# Patient Record
Sex: MALE | Race: WHITE | HISPANIC OR LATINO | Employment: OTHER | URBAN - METROPOLITAN AREA
[De-identification: names, ages, dates, MRNs, and addresses within clinical notes are randomized per-mention and may not be internally consistent; named-entity substitution may affect disease eponyms.]

---

## 2023-04-27 ENCOUNTER — LAB REQUISITION (OUTPATIENT)
Dept: LAB | Facility: CLINIC | Age: 73
End: 2023-04-27
Payer: MEDICARE

## 2023-04-27 DIAGNOSIS — E11.9 TYPE 2 DIABETES MELLITUS WITHOUT COMPLICATIONS (H): ICD-10-CM

## 2023-04-27 DIAGNOSIS — I10 ESSENTIAL (PRIMARY) HYPERTENSION: ICD-10-CM

## 2023-04-27 DIAGNOSIS — E07.9 DISORDER OF THYROID, UNSPECIFIED: ICD-10-CM

## 2023-04-27 DIAGNOSIS — E55.9 VITAMIN D DEFICIENCY, UNSPECIFIED: ICD-10-CM

## 2023-04-27 DIAGNOSIS — E53.8 DEFICIENCY OF OTHER SPECIFIED B GROUP VITAMINS: ICD-10-CM

## 2023-04-28 LAB
ALBUMIN SERPL BCG-MCNC: 3.6 G/DL (ref 3.5–5.2)
ALP SERPL-CCNC: 112 U/L (ref 40–129)
ALT SERPL W P-5'-P-CCNC: 9 U/L (ref 10–50)
ANION GAP SERPL CALCULATED.3IONS-SCNC: 12 MMOL/L (ref 7–15)
AST SERPL W P-5'-P-CCNC: 14 U/L (ref 10–50)
BILIRUB SERPL-MCNC: 0.3 MG/DL
BUN SERPL-MCNC: 10.4 MG/DL (ref 8–23)
CALCIUM SERPL-MCNC: 9.5 MG/DL (ref 8.8–10.2)
CHLORIDE SERPL-SCNC: 103 MMOL/L (ref 98–107)
CREAT SERPL-MCNC: 0.68 MG/DL (ref 0.67–1.17)
DEPRECATED CALCIDIOL+CALCIFEROL SERPL-MC: 35 UG/L (ref 20–75)
DEPRECATED HCO3 PLAS-SCNC: 24 MMOL/L (ref 22–29)
ERYTHROCYTE [DISTWIDTH] IN BLOOD BY AUTOMATED COUNT: 17.5 % (ref 10–15)
FOLATE SERPL-MCNC: 11.2 NG/ML (ref 4.6–34.8)
GFR SERPL CREATININE-BSD FRML MDRD: >90 ML/MIN/1.73M2
GLUCOSE SERPL-MCNC: 77 MG/DL (ref 70–99)
HBA1C MFR BLD: 6.2 %
HCT VFR BLD AUTO: 38.1 % (ref 40–53)
HGB BLD-MCNC: 11.4 G/DL (ref 13.3–17.7)
MCH RBC QN AUTO: 21.2 PG (ref 26.5–33)
MCHC RBC AUTO-ENTMCNC: 29.9 G/DL (ref 31.5–36.5)
MCV RBC AUTO: 71 FL (ref 78–100)
PLATELET # BLD AUTO: 238 10E3/UL (ref 150–450)
POTASSIUM SERPL-SCNC: 4 MMOL/L (ref 3.4–5.3)
PROT SERPL-MCNC: 6.2 G/DL (ref 6.4–8.3)
RBC # BLD AUTO: 5.39 10E6/UL (ref 4.4–5.9)
SODIUM SERPL-SCNC: 139 MMOL/L (ref 136–145)
TSH SERPL DL<=0.005 MIU/L-ACNC: 1.71 UIU/ML (ref 0.3–4.2)
VIT B12 SERPL-MCNC: 338 PG/ML (ref 232–1245)
WBC # BLD AUTO: 6.1 10E3/UL (ref 4–11)

## 2023-04-28 PROCEDURE — 80053 COMPREHEN METABOLIC PANEL: CPT | Mod: ORL | Performed by: FAMILY MEDICINE

## 2023-04-28 PROCEDURE — 82306 VITAMIN D 25 HYDROXY: CPT | Mod: ORL | Performed by: FAMILY MEDICINE

## 2023-04-28 PROCEDURE — 82607 VITAMIN B-12: CPT | Mod: ORL | Performed by: FAMILY MEDICINE

## 2023-04-28 PROCEDURE — 84443 ASSAY THYROID STIM HORMONE: CPT | Mod: ORL | Performed by: FAMILY MEDICINE

## 2023-04-28 PROCEDURE — 36415 COLL VENOUS BLD VENIPUNCTURE: CPT | Mod: ORL | Performed by: FAMILY MEDICINE

## 2023-04-28 PROCEDURE — P9603 ONE-WAY ALLOW PRORATED MILES: HCPCS | Mod: ORL | Performed by: FAMILY MEDICINE

## 2023-04-28 PROCEDURE — 83036 HEMOGLOBIN GLYCOSYLATED A1C: CPT | Mod: ORL | Performed by: FAMILY MEDICINE

## 2023-04-28 PROCEDURE — 85027 COMPLETE CBC AUTOMATED: CPT | Mod: ORL | Performed by: FAMILY MEDICINE

## 2023-04-28 PROCEDURE — 82746 ASSAY OF FOLIC ACID SERUM: CPT | Mod: ORL | Performed by: FAMILY MEDICINE

## 2023-06-01 ENCOUNTER — HOSPITAL ENCOUNTER (OUTPATIENT)
Dept: PET IMAGING | Facility: CLINIC | Age: 73
Discharge: HOME | End: 2023-06-01
Attending: INTERNAL MEDICINE | Admitting: INTERNAL MEDICINE
Payer: COMMERCIAL

## 2023-06-01 DIAGNOSIS — C61 PRIMARY MALIGNANT NEOPLASM OF PROSTATE (H): ICD-10-CM

## 2023-06-01 PROCEDURE — A9596 HC RX 343: HCPCS

## 2023-06-01 PROCEDURE — 343N000001 HC RX 343

## 2023-06-01 PROCEDURE — 78815 PET IMAGE W/CT SKULL-THIGH: CPT | Mod: 26 | Performed by: RADIOLOGY

## 2023-06-01 PROCEDURE — 78815 PET IMAGE W/CT SKULL-THIGH: CPT | Mod: PS

## 2023-06-01 RX ADMIN — KIT FOR THE PREPARATION OF GALLIUM GA 68 GOZETOTIDE INJECTION 5.88 MILLICURIE: KIT INTRAVENOUS at 13:50

## 2023-08-13 ENCOUNTER — HEALTH MAINTENANCE LETTER (OUTPATIENT)
Age: 73
End: 2023-08-13

## 2023-09-20 ENCOUNTER — LAB REQUISITION (OUTPATIENT)
Dept: LAB | Facility: CLINIC | Age: 73
End: 2023-09-20
Payer: MEDICARE

## 2023-09-20 DIAGNOSIS — I10 ESSENTIAL (PRIMARY) HYPERTENSION: ICD-10-CM

## 2023-09-20 DIAGNOSIS — E11.40 TYPE 2 DIABETES MELLITUS WITH DIABETIC NEUROPATHY, UNSPECIFIED (H): ICD-10-CM

## 2023-09-20 LAB
ANION GAP SERPL CALCULATED.3IONS-SCNC: 12 MMOL/L (ref 7–15)
BASOPHILS # BLD AUTO: 0 10E3/UL (ref 0–0.2)
BASOPHILS NFR BLD AUTO: 1 %
BUN SERPL-MCNC: 12 MG/DL (ref 8–23)
CALCIUM SERPL-MCNC: 9 MG/DL (ref 8.8–10.2)
CHLORIDE SERPL-SCNC: 101 MMOL/L (ref 98–107)
CREAT SERPL-MCNC: 0.71 MG/DL (ref 0.67–1.17)
DEPRECATED HCO3 PLAS-SCNC: 28 MMOL/L (ref 22–29)
EGFRCR SERPLBLD CKD-EPI 2021: >90 ML/MIN/1.73M2
EOSINOPHIL # BLD AUTO: 0.1 10E3/UL (ref 0–0.7)
EOSINOPHIL NFR BLD AUTO: 2 %
ERYTHROCYTE [DISTWIDTH] IN BLOOD BY AUTOMATED COUNT: 15.5 % (ref 10–15)
GLUCOSE SERPL-MCNC: 96 MG/DL (ref 70–99)
HBA1C MFR BLD: 6.8 %
HCT VFR BLD AUTO: 38.9 % (ref 40–53)
HGB BLD-MCNC: 11.7 G/DL (ref 13.3–17.7)
IMM GRANULOCYTES # BLD: 0 10E3/UL
IMM GRANULOCYTES NFR BLD: 0 %
LYMPHOCYTES # BLD AUTO: 2.2 10E3/UL (ref 0.8–5.3)
LYMPHOCYTES NFR BLD AUTO: 37 %
MCH RBC QN AUTO: 22.2 PG (ref 26.5–33)
MCHC RBC AUTO-ENTMCNC: 30.1 G/DL (ref 31.5–36.5)
MCV RBC AUTO: 74 FL (ref 78–100)
MONOCYTES # BLD AUTO: 0.6 10E3/UL (ref 0–1.3)
MONOCYTES NFR BLD AUTO: 10 %
NEUTROPHILS # BLD AUTO: 3 10E3/UL (ref 1.6–8.3)
NEUTROPHILS NFR BLD AUTO: 50 %
NRBC # BLD AUTO: 0 10E3/UL
NRBC BLD AUTO-RTO: 0 /100
PLATELET # BLD AUTO: 221 10E3/UL (ref 150–450)
POTASSIUM SERPL-SCNC: 3.6 MMOL/L (ref 3.4–5.3)
RBC # BLD AUTO: 5.27 10E6/UL (ref 4.4–5.9)
SODIUM SERPL-SCNC: 141 MMOL/L (ref 136–145)
WBC # BLD AUTO: 5.9 10E3/UL (ref 4–11)

## 2023-09-20 PROCEDURE — 83036 HEMOGLOBIN GLYCOSYLATED A1C: CPT | Mod: ORL | Performed by: INTERNAL MEDICINE

## 2023-09-20 PROCEDURE — P9604 ONE-WAY ALLOW PRORATED TRIP: HCPCS | Mod: ORL | Performed by: INTERNAL MEDICINE

## 2023-09-20 PROCEDURE — 85025 COMPLETE CBC W/AUTO DIFF WBC: CPT | Mod: ORL | Performed by: INTERNAL MEDICINE

## 2023-09-20 PROCEDURE — 80048 BASIC METABOLIC PNL TOTAL CA: CPT | Mod: ORL | Performed by: INTERNAL MEDICINE

## 2023-09-20 PROCEDURE — 36415 COLL VENOUS BLD VENIPUNCTURE: CPT | Mod: ORL | Performed by: INTERNAL MEDICINE

## 2023-11-02 ENCOUNTER — LAB REQUISITION (OUTPATIENT)
Dept: LAB | Facility: CLINIC | Age: 73
End: 2023-11-02
Payer: MEDICARE

## 2023-11-02 DIAGNOSIS — I10 ESSENTIAL (PRIMARY) HYPERTENSION: ICD-10-CM

## 2023-11-03 LAB
ALBUMIN SERPL BCG-MCNC: 3.6 G/DL (ref 3.5–5.2)
ALP SERPL-CCNC: 95 U/L (ref 40–129)
ALT SERPL W P-5'-P-CCNC: 11 U/L (ref 0–70)
ANION GAP SERPL CALCULATED.3IONS-SCNC: 10 MMOL/L (ref 7–15)
AST SERPL W P-5'-P-CCNC: 15 U/L (ref 0–45)
BILIRUB SERPL-MCNC: 0.2 MG/DL
BUN SERPL-MCNC: 10.8 MG/DL (ref 8–23)
CALCIUM SERPL-MCNC: 9.2 MG/DL (ref 8.8–10.2)
CHLORIDE SERPL-SCNC: 103 MMOL/L (ref 98–107)
CREAT SERPL-MCNC: 0.7 MG/DL (ref 0.67–1.17)
DEPRECATED HCO3 PLAS-SCNC: 29 MMOL/L (ref 22–29)
EGFRCR SERPLBLD CKD-EPI 2021: >90 ML/MIN/1.73M2
GLUCOSE SERPL-MCNC: 83 MG/DL (ref 70–99)
MAGNESIUM SERPL-MCNC: 1.8 MG/DL (ref 1.7–2.3)
POTASSIUM SERPL-SCNC: 4.1 MMOL/L (ref 3.4–5.3)
PROT SERPL-MCNC: 6.5 G/DL (ref 6.4–8.3)
SODIUM SERPL-SCNC: 142 MMOL/L (ref 135–145)

## 2023-11-03 PROCEDURE — 80053 COMPREHEN METABOLIC PANEL: CPT | Mod: ORL | Performed by: INTERNAL MEDICINE

## 2023-11-03 PROCEDURE — 83735 ASSAY OF MAGNESIUM: CPT | Mod: ORL | Performed by: INTERNAL MEDICINE

## 2023-11-03 PROCEDURE — P9604 ONE-WAY ALLOW PRORATED TRIP: HCPCS | Mod: ORL | Performed by: INTERNAL MEDICINE

## 2023-11-03 PROCEDURE — 36415 COLL VENOUS BLD VENIPUNCTURE: CPT | Mod: ORL | Performed by: INTERNAL MEDICINE

## 2023-11-04 ENCOUNTER — LAB REQUISITION (OUTPATIENT)
Dept: LAB | Facility: CLINIC | Age: 73
End: 2023-11-04
Payer: MEDICARE

## 2023-11-04 DIAGNOSIS — I10 ESSENTIAL (PRIMARY) HYPERTENSION: ICD-10-CM

## 2023-11-24 ENCOUNTER — LAB REQUISITION (OUTPATIENT)
Dept: LAB | Facility: CLINIC | Age: 73
End: 2023-11-24
Payer: MEDICARE

## 2023-11-24 DIAGNOSIS — R82.90 UNSPECIFIED ABNORMAL FINDINGS IN URINE: ICD-10-CM

## 2023-11-24 LAB
ALBUMIN UR-MCNC: 30 MG/DL
APPEARANCE UR: ABNORMAL
BILIRUB UR QL STRIP: NEGATIVE
COLOR UR AUTO: ABNORMAL
GLUCOSE UR STRIP-MCNC: NEGATIVE MG/DL
HGB UR QL STRIP: ABNORMAL
KETONES UR STRIP-MCNC: NEGATIVE MG/DL
LEUKOCYTE ESTERASE UR QL STRIP: ABNORMAL
NITRATE UR QL: NEGATIVE
PH UR STRIP: 7.5 [PH] (ref 5–7)
RBC URINE: >182 /HPF
SP GR UR STRIP: 1.01 (ref 1–1.03)
TRANSITIONAL EPI: <1 /HPF
UROBILINOGEN UR STRIP-MCNC: NORMAL MG/DL
WBC URINE: 29 /HPF

## 2023-11-24 PROCEDURE — 81001 URINALYSIS AUTO W/SCOPE: CPT | Mod: ORL | Performed by: INTERNAL MEDICINE

## 2023-11-24 PROCEDURE — 87086 URINE CULTURE/COLONY COUNT: CPT | Mod: ORL | Performed by: INTERNAL MEDICINE

## 2023-11-25 LAB — BACTERIA UR CULT: NORMAL

## 2023-11-26 ENCOUNTER — LAB REQUISITION (OUTPATIENT)
Dept: LAB | Facility: CLINIC | Age: 73
End: 2023-11-26
Payer: MEDICARE

## 2023-11-26 DIAGNOSIS — E11.40 TYPE 2 DIABETES MELLITUS WITH DIABETIC NEUROPATHY, UNSPECIFIED (H): ICD-10-CM

## 2023-11-28 LAB
ANION GAP SERPL CALCULATED.3IONS-SCNC: 12 MMOL/L (ref 7–15)
BASOPHILS # BLD AUTO: 0 10E3/UL (ref 0–0.2)
BASOPHILS NFR BLD AUTO: 1 %
BUN SERPL-MCNC: 10.6 MG/DL (ref 8–23)
CALCIUM SERPL-MCNC: 9.4 MG/DL (ref 8.8–10.2)
CHLORIDE SERPL-SCNC: 101 MMOL/L (ref 98–107)
CREAT SERPL-MCNC: 0.69 MG/DL (ref 0.67–1.17)
DEPRECATED HCO3 PLAS-SCNC: 25 MMOL/L (ref 22–29)
EGFRCR SERPLBLD CKD-EPI 2021: >90 ML/MIN/1.73M2
EOSINOPHIL # BLD AUTO: 0.2 10E3/UL (ref 0–0.7)
EOSINOPHIL NFR BLD AUTO: 2 %
ERYTHROCYTE [DISTWIDTH] IN BLOOD BY AUTOMATED COUNT: 15.7 % (ref 10–15)
GLUCOSE SERPL-MCNC: 155 MG/DL (ref 70–99)
HCT VFR BLD AUTO: 38 % (ref 40–53)
HGB BLD-MCNC: 11.5 G/DL (ref 13.3–17.7)
IMM GRANULOCYTES # BLD: 0 10E3/UL
IMM GRANULOCYTES NFR BLD: 1 %
LYMPHOCYTES # BLD AUTO: 2.4 10E3/UL (ref 0.8–5.3)
LYMPHOCYTES NFR BLD AUTO: 37 %
MCH RBC QN AUTO: 21.6 PG (ref 26.5–33)
MCHC RBC AUTO-ENTMCNC: 30.3 G/DL (ref 31.5–36.5)
MCV RBC AUTO: 71 FL (ref 78–100)
MONOCYTES # BLD AUTO: 0.6 10E3/UL (ref 0–1.3)
MONOCYTES NFR BLD AUTO: 9 %
NEUTROPHILS # BLD AUTO: 3.4 10E3/UL (ref 1.6–8.3)
NEUTROPHILS NFR BLD AUTO: 50 %
NRBC # BLD AUTO: 0 10E3/UL
NRBC BLD AUTO-RTO: 0 /100
PLATELET # BLD AUTO: 243 10E3/UL (ref 150–450)
POTASSIUM SERPL-SCNC: 3.7 MMOL/L (ref 3.4–5.3)
RBC # BLD AUTO: 5.33 10E6/UL (ref 4.4–5.9)
SODIUM SERPL-SCNC: 138 MMOL/L (ref 135–145)
WBC # BLD AUTO: 6.6 10E3/UL (ref 4–11)

## 2023-11-28 PROCEDURE — 85025 COMPLETE CBC W/AUTO DIFF WBC: CPT | Mod: ORL | Performed by: INTERNAL MEDICINE

## 2023-11-28 PROCEDURE — 36415 COLL VENOUS BLD VENIPUNCTURE: CPT | Mod: ORL | Performed by: INTERNAL MEDICINE

## 2023-11-28 PROCEDURE — 80048 BASIC METABOLIC PNL TOTAL CA: CPT | Mod: ORL | Performed by: INTERNAL MEDICINE

## 2023-11-28 PROCEDURE — P9604 ONE-WAY ALLOW PRORATED TRIP: HCPCS | Mod: ORL | Performed by: INTERNAL MEDICINE

## 2023-12-31 ENCOUNTER — HEALTH MAINTENANCE LETTER (OUTPATIENT)
Age: 73
End: 2023-12-31

## 2024-01-24 ENCOUNTER — LAB REQUISITION (OUTPATIENT)
Dept: LAB | Facility: CLINIC | Age: 74
End: 2024-01-24
Payer: MEDICARE

## 2024-01-24 DIAGNOSIS — I10 ESSENTIAL (PRIMARY) HYPERTENSION: ICD-10-CM

## 2024-01-25 LAB
ANION GAP SERPL CALCULATED.3IONS-SCNC: 10 MMOL/L (ref 7–15)
BUN SERPL-MCNC: 14.7 MG/DL (ref 8–23)
CALCIUM SERPL-MCNC: 9.3 MG/DL (ref 8.8–10.2)
CHLORIDE SERPL-SCNC: 100 MMOL/L (ref 98–107)
CHOLEST SERPL-MCNC: 172 MG/DL
CREAT SERPL-MCNC: 0.79 MG/DL (ref 0.67–1.17)
DEPRECATED HCO3 PLAS-SCNC: 27 MMOL/L (ref 22–29)
EGFRCR SERPLBLD CKD-EPI 2021: >90 ML/MIN/1.73M2
FASTING STATUS PATIENT QL REPORTED: NO
GLUCOSE SERPL-MCNC: 136 MG/DL (ref 70–99)
HDLC SERPL-MCNC: 64 MG/DL
LDLC SERPL CALC-MCNC: 85 MG/DL
NONHDLC SERPL-MCNC: 108 MG/DL
POTASSIUM SERPL-SCNC: 3.8 MMOL/L (ref 3.4–5.3)
SODIUM SERPL-SCNC: 137 MMOL/L (ref 135–145)
TRIGL SERPL-MCNC: 114 MG/DL

## 2024-01-25 PROCEDURE — 80048 BASIC METABOLIC PNL TOTAL CA: CPT | Mod: ORL | Performed by: INTERNAL MEDICINE

## 2024-01-25 PROCEDURE — 80061 LIPID PANEL: CPT | Mod: ORL | Performed by: INTERNAL MEDICINE

## 2024-01-25 PROCEDURE — 36415 COLL VENOUS BLD VENIPUNCTURE: CPT | Mod: ORL | Performed by: INTERNAL MEDICINE

## 2024-01-25 PROCEDURE — P9604 ONE-WAY ALLOW PRORATED TRIP: HCPCS | Mod: ORL | Performed by: INTERNAL MEDICINE

## 2024-03-13 ENCOUNTER — LAB REQUISITION (OUTPATIENT)
Dept: LAB | Facility: CLINIC | Age: 74
End: 2024-03-13
Payer: MEDICARE

## 2024-03-13 DIAGNOSIS — N39.0 URINARY TRACT INFECTION, SITE NOT SPECIFIED: ICD-10-CM

## 2024-05-19 ENCOUNTER — HEALTH MAINTENANCE LETTER (OUTPATIENT)
Age: 74
End: 2024-05-19

## 2024-06-05 ENCOUNTER — HOSPITAL ENCOUNTER (EMERGENCY)
Facility: CLINIC | Age: 74
Discharge: NURSING, REHAB OR LONG-TERM CARE | End: 2024-06-05
Attending: FAMILY MEDICINE | Admitting: FAMILY MEDICINE
Payer: COMMERCIAL

## 2024-06-05 VITALS
RESPIRATION RATE: 20 BRPM | HEART RATE: 83 BPM | DIASTOLIC BLOOD PRESSURE: 81 MMHG | HEIGHT: 66 IN | WEIGHT: 195 LBS | SYSTOLIC BLOOD PRESSURE: 141 MMHG | BODY MASS INDEX: 31.34 KG/M2 | TEMPERATURE: 98.4 F | OXYGEN SATURATION: 95 %

## 2024-06-05 DIAGNOSIS — Z46.6 URINARY CATHETER (FOLEY) CHANGE REQUIRED: ICD-10-CM

## 2024-06-05 PROCEDURE — 250N000009 HC RX 250: Performed by: FAMILY MEDICINE

## 2024-06-05 PROCEDURE — 99283 EMERGENCY DEPT VISIT LOW MDM: CPT | Mod: 25

## 2024-06-05 RX ORDER — LIDOCAINE HYDROCHLORIDE 20 MG/ML
10 JELLY TOPICAL ONCE
Status: COMPLETED | OUTPATIENT
Start: 2024-06-05 | End: 2024-06-05

## 2024-06-05 RX ADMIN — LIDOCAINE HYDROCHLORIDE 10 ML: 20 JELLY TOPICAL at 22:43

## 2024-06-05 ASSESSMENT — COLUMBIA-SUICIDE SEVERITY RATING SCALE - C-SSRS
2. HAVE YOU ACTUALLY HAD ANY THOUGHTS OF KILLING YOURSELF IN THE PAST MONTH?: NO
6. HAVE YOU EVER DONE ANYTHING, STARTED TO DO ANYTHING, OR PREPARED TO DO ANYTHING TO END YOUR LIFE?: NO
1. IN THE PAST MONTH, HAVE YOU WISHED YOU WERE DEAD OR WISHED YOU COULD GO TO SLEEP AND NOT WAKE UP?: NO

## 2024-06-05 ASSESSMENT — ACTIVITIES OF DAILY LIVING (ADL)
ADLS_ACUITY_SCORE: 35
ADLS_ACUITY_SCORE: 35

## 2024-06-06 NOTE — ED PROVIDER NOTES
EMERGENCY DEPARTMENT ENCOUNTER      NAME: James Esteban  AGE: 73 year old male  YOB: 1950  MRN: [id number]  EVALUATION DATE & TIME: 6/5/2024  9:30 PM    PCP: [facility]    ED PROVIDER: Juan Ramon Farris M.D.    Chief Complaint   Patient presents with    Catheter Problem       FINAL IMPRESSION:  1. Urinary catheter ([place]) change required        ED COURSE & MEDICAL DECISION MAKING:    Pertinent Labs & Imaging studies independently interpreted by me. (See chart for details)  Reviewed most recent primary care summary, patient with incomplete paraplegia and hypertension as well as history of malignant neoplasm of the prostate and neuromuscular dysfunction of the bladder which is why he has a indwelling urinary catheter.    ED Course as of 06/05/24 2249 Wed Jun 05, 2024 2212 Patient seen and examined, presents today with catheter concern.  Patient says that his penis hurts although he feels better now.  On exam, uncircumcised male, catheter in place and foreskin is in place.  The catheter has dark yellow urine, when the bag is placed to gravity it does drain.  Due to pain and concern for obstruction, catheter will be changed out today.  No fever, abdominal pain, nausea or vomiting to suggest infection.       10:08 PM  I met with the patient, obtained history, performed an initial exam, and discussed options and plan for diagnostics and treatment here in the ED.    10:30 PM We discussed the plan for discharge and the patient is agreeable. Reviewed supportive cares, symptomatic treatment, outpatient follow up, and reasons to return to the Emergency Department. Patient to be discharged by ED RN.    At the conclusion of the encounter I discussed the results of all of the tests and the disposition. The questions were answered. The patient or family acknowledged understanding and was agreeable with the care plan.     Medical Decision Making  Obtained supplemental history:Supplemental  history obtained?: Documented in chart  Reviewed external records: External records reviewed?: Documented in chart  Care impacted by chronic illness:Cancer/Chemotherapy, Diabetes, and Hypertension  Care significantly affected by social determinants of health:N/A  Did you consider but not order tests?: Work up considered but not performed and documented in chart, if applicable  Did you interpret images independently?: Independent interpretation of ECG and images noted in documentation, when applicable.  Consultation discussion with other provider:Did you involve another provider (consultant, , pharmacy, etc.)?: No  Discharge. No recommendations on prescription strength medication(s). See documentation for any additional details.        PROCEDURES:       MEDICATIONS GIVEN IN THE EMERGENCY:  Medications   lidocaine (XYLOCAINE) 2 % external gel 10 mL (10 mLs Urethral $Given 6/5/24 7342)       NEW PRESCRIPTIONS STARTED AT TODAY'S ER VISIT  New Prescriptions    No medications on file       =================================================================    HPI    Patient information was obtained from: Patient and Nurse triage note      James Esteban is a 73 year old male with a pertinent history of prostate cancer,  hypertension, and type II diabetes who presents to this ED by EMS for evaluation of a catheter problem.    Per nurse triage note, patient comes in by EMS today (6/05/24) from a senior living group home. He has had issues with his indwelling catheter. Noted to smell of foul urine upon arrival.     Patient reports penile pain for unspecified amount of time.  His catheter was last changed two weeks ago and has not been draining adequately.He has a history of prostate cancer with metastasis to his back. Following complications of tumour removals he became  wheelchair bound and had the catheter placed.    He denied any fever or vomiting. Also denied any known allergies. No other complaints at this  "time.    REVIEW OF SYSTEMS   Review of Systems   All other systems reviewed and negative    PAST MEDICAL HISTORY:  No past medical history on file.    PAST SURGICAL HISTORY:  No past surgical history on file.    CURRENT MEDICATIONS:    No current facility-administered medications for this encounter.     No current outpatient medications on file.       ALLERGIES:  Allergies   Allergen Reactions    Nuts Anaphylaxis    Shellfish Allergy Anaphylaxis, Hives and Shortness Of Breath    Cats        FAMILY HISTORY:  No family history on file.    SOCIAL HISTORY:   Social History     Socioeconomic History    Marital status:      Social Determinants of Health     Financial Resource Strain: Medium Risk (4/28/2023)    Received from [facility], [employer]  [facility]    Financial Resource Strain     Difficulty of Paying Living Expenses: 1     Difficulty of Paying Living Expenses: 2   Food Insecurity: No Food Insecurity (4/28/2023)    [facility], [facility]    Food Insecurity     Worried About Running Out of Food in the Last Year: 1   Transportation Needs: Unmet Transportation Needs (4/28/2023)    [facility], [facility]    Transportation Needs     Lack of Transportation (Medical): 2    Received from [facility]    Social Connections   Housing Stability: Low Risk  (4/28/2023)    Received from [facility], [email or web address]    Housing Stability     Unable to Pay for Housing in the Last Year: 1       VITALS:  BP (!) 156/90   Pulse 84   Temp 98.4  F (36.9  C) (Oral)   Resp 20   Ht 1.676 m (5' 6\")   Wt 88.5 kg (195 lb)   SpO2 96%   BMI 31.47 kg/m      PHYSICAL EXAM:  Physical Exam  Vitals and nursing note reviewed. "   Constitutional:       Appearance: Normal appearance.   HENT:      Head: Normocephalic and atraumatic.      Right Ear: External ear normal.      Left Ear: External ear normal.      Nose: Nose normal.      Mouth/Throat:      Mouth: Mucous membranes are moist.   Eyes:      Extraocular Movements: Extraocular movements intact.      Conjunctiva/sclera: Conjunctivae normal.      Pupils: Pupils are equal, round, and reactive to light.   Cardiovascular:      Rate and Rhythm: Normal rate and regular rhythm.   Pulmonary:      Effort: Pulmonary effort is normal.      Breath sounds: Normal breath sounds. No wheezing or rales.   Abdominal:      General: Abdomen is flat. There is no distension.      Palpations: Abdomen is soft.      Tenderness: There is no abdominal tenderness. There is no guarding.   Genitourinary:     Penis: Uncircumcised.       Comments: Urinary catheter in place slowly draining yellow urine.  Musculoskeletal:         General: Normal range of motion.      Cervical back: Normal range of motion and neck supple.      Right lower leg: No edema.      Left lower leg: No edema.      Comments: Bilateral lower extremity atrophy.   Lymphadenopathy:      Cervical: No cervical adenopathy.   Skin:     General: Skin is warm and dry.   Neurological:      General: No focal deficit present.      Mental Status: He is alert and oriented to person, place, and time. Mental status is at baseline.      Comments: No gross focal neurologic deficits   Psychiatric:         Mood and Affect: Mood normal.         Behavior: Behavior normal.         Thought Content: Thought content normal.          LAB:  All pertinent labs reviewed and interpreted.       RADIOLOGY:  Reviewed all pertinent imaging. Please see official radiology report.  No orders to display       Niall BURDICK , am serving as a scribe to document services personally performed by Dr. Farris based on my observation and the provider's statements to me. Juan Ramon BURDICK  MD Kaleigh attest that Niall Casiano  is acting in a scribe capacity, has observed my performance of the services and has documented them in accordance with my direction.    Juan Ramon Farris M.D.  Emergency Medicine  [facility] EMERGENCY ROOM  [address] [address]  [phone]  Dept: [phone]       Juan Ramon Farris MD  06/06/24 1522

## 2024-06-06 NOTE — ED NOTES
Writer called and spoke to , told her that we replaced patient's indwelling catheter and will be sending him back.

## 2024-06-06 NOTE — ED TRIAGE NOTES
Increase carvedilol to 25mg BID - will send new prescription to pharmacy to update. Recheck blood pressure AND PULSE on ancillary schedule in 2 weeks, please schedule   Patient presents to the ED, brought in by EMS, he comes from [facility].  The reported patient is having issues with his indwelling catheter not draining.  Patient smells of foul urine upon arrival.  No other complaint.     Triage Assessment (Adult)       Row Name 06/05/24 0976          Triage Assessment    Airway WDL WDL        Respiratory WDL    Respiratory WDL WDL        Skin Circulation/Temperature WDL    Skin Circulation/Temperature WDL WDL        Cardiac WDL    Cardiac WDL WDL        Peripheral/Neurovascular WDL    Peripheral Neurovascular WDL WDL  paraplegia        Cognitive/Neuro/Behavioral WDL    Cognitive/Neuro/Behavioral WDL WDL

## 2024-06-25 ENCOUNTER — LAB REQUISITION (OUTPATIENT)
Dept: LAB | Facility: CLINIC | Age: 74
End: 2024-06-25
Payer: MEDICARE

## 2024-06-25 DIAGNOSIS — E87.5 HYPERKALEMIA: ICD-10-CM

## 2024-06-27 LAB — POTASSIUM SERPL-SCNC: 4.4 MMOL/L (ref 3.4–5.3)

## 2024-06-27 PROCEDURE — P9604 ONE-WAY ALLOW PRORATED TRIP: HCPCS | Mod: ORL | Performed by: STUDENT IN AN ORGANIZED HEALTH CARE EDUCATION/TRAINING PROGRAM

## 2024-06-27 PROCEDURE — 36415 COLL VENOUS BLD VENIPUNCTURE: CPT | Mod: ORL | Performed by: STUDENT IN AN ORGANIZED HEALTH CARE EDUCATION/TRAINING PROGRAM

## 2024-06-27 PROCEDURE — 84132 ASSAY OF SERUM POTASSIUM: CPT | Mod: ORL | Performed by: STUDENT IN AN ORGANIZED HEALTH CARE EDUCATION/TRAINING PROGRAM

## 2024-10-06 ENCOUNTER — HEALTH MAINTENANCE LETTER (OUTPATIENT)
Age: 74
End: 2024-10-06

## 2025-01-19 ENCOUNTER — HEALTH MAINTENANCE LETTER (OUTPATIENT)
Age: 75
End: 2025-01-19

## 2025-01-24 ENCOUNTER — NURSING HOME VISIT (OUTPATIENT)
Dept: GERIATRICS | Facility: CLINIC | Age: 75
End: 2025-01-24
Payer: MEDICARE

## 2025-01-24 ENCOUNTER — DOCUMENTATION ONLY (OUTPATIENT)
Dept: GERIATRICS | Facility: CLINIC | Age: 75
End: 2025-01-24

## 2025-01-24 VITALS
BODY MASS INDEX: 27.24 KG/M2 | OXYGEN SATURATION: 96 % | SYSTOLIC BLOOD PRESSURE: 128 MMHG | RESPIRATION RATE: 18 BRPM | DIASTOLIC BLOOD PRESSURE: 82 MMHG | WEIGHT: 169.5 LBS | HEIGHT: 66 IN | HEART RATE: 78 BPM | TEMPERATURE: 97.8 F

## 2025-01-24 DIAGNOSIS — C79.51 MALIGNANT NEOPLASM OF PROSTATE METASTATIC TO BONE (H): Primary | ICD-10-CM

## 2025-01-24 DIAGNOSIS — E11.9 TYPE 2 DIABETES MELLITUS WITHOUT COMPLICATION, WITHOUT LONG-TERM CURRENT USE OF INSULIN (H): ICD-10-CM

## 2025-01-24 DIAGNOSIS — N13.9 OBSTRUCTIVE UROPATHY: ICD-10-CM

## 2025-01-24 DIAGNOSIS — C61 MALIGNANT NEOPLASM OF PROSTATE METASTATIC TO BONE (H): Primary | ICD-10-CM

## 2025-01-24 DIAGNOSIS — G82.22 PARAPLEGIA, INCOMPLETE (H): ICD-10-CM

## 2025-01-24 DIAGNOSIS — I10 PRIMARY HYPERTENSION: ICD-10-CM

## 2025-01-24 PROCEDURE — 99310 SBSQ NF CARE HIGH MDM 45: CPT | Performed by: NURSE PRACTITIONER

## 2025-01-24 RX ORDER — LIDOCAINE 50 MG/G
OINTMENT TOPICAL DAILY
COMMUNITY

## 2025-01-24 RX ORDER — METFORMIN HYDROCHLORIDE 500 MG/1
500 TABLET, EXTENDED RELEASE ORAL 2 TIMES DAILY WITH MEALS
COMMUNITY
End: 2025-01-24

## 2025-01-24 RX ORDER — MAGNESIUM OXIDE 420 MG/1
1 TABLET ORAL 2 TIMES DAILY
COMMUNITY

## 2025-01-24 RX ORDER — METOPROLOL SUCCINATE 25 MG/1
25 TABLET, EXTENDED RELEASE ORAL DAILY
COMMUNITY

## 2025-01-24 RX ORDER — BACLOFEN 20 MG/1
20 TABLET ORAL 2 TIMES DAILY
COMMUNITY

## 2025-01-24 RX ORDER — TIZANIDINE HYDROCHLORIDE 4 MG/1
4 CAPSULE, GELATIN COATED ORAL 3 TIMES DAILY
COMMUNITY

## 2025-01-24 RX ORDER — HYDRALAZINE HYDROCHLORIDE 10 MG/1
10 TABLET, FILM COATED ORAL 3 TIMES DAILY PRN
COMMUNITY

## 2025-01-24 RX ORDER — CHLORTHALIDONE 25 MG/1
12.5 TABLET ORAL DAILY
COMMUNITY

## 2025-01-24 RX ORDER — ACETAMINOPHEN 325 MG/1
650 TABLET ORAL EVERY 6 HOURS PRN
COMMUNITY

## 2025-01-24 RX ORDER — SENNOSIDES 8.6 MG
2 TABLET ORAL AT BEDTIME
COMMUNITY

## 2025-01-24 RX ORDER — LORATADINE 10 MG/1
10 TABLET ORAL DAILY
COMMUNITY

## 2025-01-24 RX ORDER — LOSARTAN POTASSIUM 100 MG/1
100 TABLET ORAL DAILY
COMMUNITY

## 2025-01-24 RX ORDER — LIDOCAINE HYDROCHLORIDE 20 MG/ML
JELLY TOPICAL PRN
COMMUNITY

## 2025-01-24 NOTE — LETTER
1/24/2025      James Esteban  [facility]  [address]  [facility] [address]        [facility] GERIATRICS    PRIMARY CARE PROVIDER AND CLINIC:  [facility], [address] [facility] [address]  Chief Complaint   Patient presents with     [facility] Medical Record Number:  [id number]  Place of Service where encounter took place:  [facility] ([facility]) [[id number]]    James Esteban  is a 74 year old  (1950), [facility] the above [facility]. There is only a pre-op physical for surgery that is scheduled 1/29/25. He is undergoing hemithyroidectomy due to a suspicious nodule. PMH prostate cancer with mets to bone, Paraplegia due to mets s/p T9-11 decompressive spinal surgery 10/2022, DM2, HTN, neurogenic bowel.   There is a medicare annual visit done at [facility] 4/28/23. They noted that he just moved from [place] [facility]. During the April visit, his daughter said he had prostate surgery November 2022, but no chemo. He had a PET scan 6/2023 that showed multiple osseous metastases and tumor in right prostate gland. [facility], no further records in [facility].     HPI obtained from patient visit, review of nursing home record, discussion with facility staff, and Epic review.     HPI:    Patient is seen only briefly, staff are trying to get him up and OT is waiting for him. He mentions his surgery next week. Provider clarifies if he is here to stay or here for TCU. He says that he is staying here now.       CODE STATUS/ADVANCE DIRECTIVES DISCUSSION:  No CPR- Do NOT Intubate    ALLERGIES:   Allergies   Allergen Reactions     Nuts Anaphylaxis     Shellfish Allergy Anaphylaxis, Hives and Shortness Of Breath     Cats       PAST MEDICAL HISTORY: No past medical history on file.   PAST SURGICAL HISTORY:   has no past  surgical history on file.  FAMILY HISTORY: family history is not on file.  SOCIAL HISTORY:         Post Discharge Medication Reconciliation Status:   MED REC REQUIRED  Post Medication Reconciliation Status:  Discharge medications reconciled, continue medications without change       Current Outpatient Medications   Medication Sig Dispense Refill     acetaminophen (TYLENOL) 325 MG tablet Take 650 mg by mouth every 6 hours as needed for mild pain.       baclofen (LIORESAL) 20 MG tablet Take 20 mg by mouth 2 times daily.       chlorthalidone (HYGROTON) 25 MG tablet Take 12.5 mg by mouth daily.       darolutamide (NUBEQA) 300 MG tablet Take 600 mg by mouth 2 times daily. . Swallow tablets whole with food.       docusate sodium (ENEMEEZ) 283 MG enema Place 1 enema rectally daily.       hydrALAZINE (APRESOLINE) 10 MG tablet Take 10 mg by mouth 3 times daily as needed.       lidocaine (XYLOCAINE) 5 % external ointment Apply topically daily.       lidocaine (XYLOCAINE) external gel as needed for moderate pain.       loratadine (CLARITIN) 10 MG tablet Take 10 mg by mouth daily.       losartan (COZAAR) 100 MG tablet Take 100 mg by mouth daily.       Magnesium Oxide 420 MG TABS Take 1 tablet by mouth 2 times daily.       melatonin 3 MG CAPS Take 6 mg by mouth at bedtime.       metoprolol succinate ER (TOPROL XL) 25 MG 24 hr tablet Take 25 mg by mouth daily.       miconazole (MICATIN) 2 % external powder Apply topically 2 times daily.       NITROGLYCERIN TRANSDERMAL TD Place onto the skin.       omeprazole (PRILOSEC) 20 MG DR capsule Take 20 mg by mouth daily.       sennosides (SENOKOT) 8.6 MG tablet Take 2 tablets by mouth at bedtime.       tiZANidine (ZANAFLEX) 4 MG capsule Take 4 mg by mouth 3 times daily.       No current facility-administered medications for this visit.       ROS:  4 point ROS including Respiratory, CV, GI and , other than that noted in the HPI,  is negative    Vitals:  /82   Pulse 78   Temp 97.8  " F (36.6  C)   Resp 18   Ht 1.676 m (5' 6\")   Wt 76.9 kg (169 lb 8 oz)   SpO2 96%   BMI 27.36 kg/m    Exam:  GENERAL APPEARANCE:  Alert, in no distress  RESP:  no respiratory distress  CV:  no edema  :    Morales catheter, urine clear, gunnar  M/S:   no purposeful movement in legs  PSYCH:  oriented X 3, affect and mood normal    Lab/Diagnostic data:  [facility]    ASSESSMENT/PLAN:  (C61,  C79.51) Malignant neoplasm of prostate metastatic to bone (H)  (primary encounter diagnosis)  Comment: Current status unknown, but a typical prognosis would be about 5 years. Will try to discuss this further with patient at next visit. He obviously is not interested in comfort care yet given that he is having the thyroid surgery.   Plan: Continue current POC with no changes at this time and adjustments as needed.    (G82.22) Paraplegia, incomplete (H)  Comment: Due to cancer. No improvement expected. Requires 24 hour care  Plan: Continue current POC with no changes at this time and adjustments as needed.    (I10) Primary hypertension  Comment: Currently well controlled  Plan: Continue current POC with no changes at this time and adjustments as needed.    (E11.9) Type 2 diabetes mellitus without complication, without long-term current use of insulin (H)  Comment: On metformin. Controlled per A1c  Plan: Continue current POC with no changes at this time and adjustments as needed.    (N13.9) Obstructive uropathy  Comment: Chronic morales in place. This could be due to either obstructive uropathy or neurogenic bladder from cancer. No improvement expected        Total time spent with patient visit was 45 min including patient visit and review of past records.     Electronically signed by:  TRINITY Guthrie CNP                     Sincerely,        TRINITY Guthrie CNP    Electronically signed  "

## 2025-01-24 NOTE — PROGRESS NOTES
[facility] GERIATRICS    PRIMARY CARE PROVIDER AND CLINIC:  [facility], [address] [facility] [address]  Chief Complaint   Patient presents with    [facility] Medical Record Number:  [id number]  Place of Service where encounter took place:  [facility] [[id number]]    James Esteban  is a 74 year old  (1950), admitted to the above facility 1/22/25 from [facility]. [facility]. There is only a pre-op physical for surgery that is scheduled 1/29/25. He is undergoing hemithyroidectomy due to a suspicious nodule. PMH prostate cancer with mets to bone, Paraplegia due to mets s/p T9-11 decompressive spinal surgery 10/2022, DM2, HTN, neurogenic bowel.   There is a medicare annual visit done at [facility] 4/28/23. They noted that he just moved from [place] [facility]. During the April visit, his daughter said he had prostate surgery November 2022, but no chemo. He had a PET scan 6/2023 that showed multiple osseous metastases and tumor in right prostate gland. [facility], no further records in [facility].     HPI obtained from patient visit, review of nursing home record, discussion with facility staff, and Epic review.     HPI:    Patient is seen only briefly, staff are trying to get him up and OT is waiting for him. He mentions his surgery next week. Provider clarifies if he is here to stay or here for TCU. He says that he is staying here now.       CODE STATUS/ADVANCE DIRECTIVES DISCUSSION:  No CPR- Do NOT Intubate    ALLERGIES:   Allergies   Allergen Reactions    Nuts Anaphylaxis    Shellfish Allergy Anaphylaxis, Hives and Shortness Of Breath    Cats       PAST MEDICAL HISTORY: No past medical history on file.   PAST SURGICAL HISTORY:   has no past surgical history on file.  FAMILY HISTORY: family history is not on file.  SOCIAL HISTORY:         Post Discharge  "Medication Reconciliation Status:   MED REC REQUIRED  Post Medication Reconciliation Status:  Discharge medications reconciled, continue medications without change       Current Outpatient Medications   Medication Sig Dispense Refill    acetaminophen (TYLENOL) 325 MG tablet Take 650 mg by mouth every 6 hours as needed for mild pain.      baclofen (LIORESAL) 20 MG tablet Take 20 mg by mouth 2 times daily.      chlorthalidone (HYGROTON) 25 MG tablet Take 12.5 mg by mouth daily.      darolutamide (NUBEQA) 300 MG tablet Take 600 mg by mouth 2 times daily. . Swallow tablets whole with food.      docusate sodium (ENEMEEZ) 283 MG enema Place 1 enema rectally daily.      hydrALAZINE (APRESOLINE) 10 MG tablet Take 10 mg by mouth 3 times daily as needed.      lidocaine (XYLOCAINE) 5 % external ointment Apply topically daily.      lidocaine (XYLOCAINE) external gel as needed for moderate pain.      loratadine (CLARITIN) 10 MG tablet Take 10 mg by mouth daily.      losartan (COZAAR) 100 MG tablet Take 100 mg by mouth daily.      Magnesium Oxide 420 MG TABS Take 1 tablet by mouth 2 times daily.      melatonin 3 MG CAPS Take 6 mg by mouth at bedtime.      metoprolol succinate ER (TOPROL XL) 25 MG 24 hr tablet Take 25 mg by mouth daily.      miconazole (MICATIN) 2 % external powder Apply topically 2 times daily.      NITROGLYCERIN TRANSDERMAL TD Place onto the skin.      omeprazole (PRILOSEC) 20 MG DR capsule Take 20 mg by mouth daily.      sennosides (SENOKOT) 8.6 MG tablet Take 2 tablets by mouth at bedtime.      tiZANidine (ZANAFLEX) 4 MG capsule Take 4 mg by mouth 3 times daily.       No current facility-administered medications for this visit.       ROS:  4 point ROS including Respiratory, CV, GI and , other than that noted in the HPI,  is negative    Vitals:  /82   Pulse 78   Temp 97.8  F (36.6  C)   Resp 18   Ht 1.676 m (5' 6\")   Wt 76.9 kg (169 lb 8 oz)   SpO2 96%   BMI 27.36 kg/m    Exam:  GENERAL " APPEARANCE:  Alert, in no distress  RESP:  no respiratory distress  CV:  no edema  :    Morales catheter, urine clear, gunnar  M/S:   no purposeful movement in legs  PSYCH:  oriented X 3, affect and mood normal    Lab/Diagnostic data:  [facility] on [facility] pre[facility] reviewed    ASSESSMENT/PLAN:  (C61,  C79.51) Malignant neoplasm of prostate metastatic to bone (H)  (primary encounter diagnosis)  Comment: Current status unknown, but a typical prognosis would be about 5 years. Will try to discuss this further with patient at next visit. He obviously is not interested in comfort care yet given that he is having the thyroid surgery.   Plan: Continue current POC with no changes at this time and adjustments as needed.    (G82.22) Paraplegia, incomplete (H)  Comment: Due to cancer. No improvement expected. Requires 24 hour care  Plan: Continue current POC with no changes at this time and adjustments as needed.    (I10) Primary hypertension  Comment: Currently well controlled  Plan: Continue current POC with no changes at this time and adjustments as needed.    (E11.9) Type 2 diabetes mellitus without complication, without long-term current use of insulin (H)  Comment: On metformin. Controlled per A1c  Plan: Continue current POC with no changes at this time and adjustments as needed.    (N13.9) Obstructive uropathy  Comment: Chronic morales in place. This could be due to either obstructive uropathy or neurogenic bladder from cancer. No improvement expected        Total time spent with patient visit was 45 min including patient visit and review of past records.     Electronically signed by:  TRINITY Guthrie CNP

## 2025-01-27 PROBLEM — M10.9 GOUT: Status: ACTIVE | Noted: 2018-03-31

## 2025-01-27 PROBLEM — K59.2 NEUROGENIC BOWEL, NOT ELSEWHERE CLASSIFIED: Status: ACTIVE | Noted: 2023-09-12

## 2025-01-27 PROBLEM — K21.9 GASTRO-ESOPHAGEAL REFLUX DISEASE WITHOUT ESOPHAGITIS: Status: ACTIVE | Noted: 2023-09-12

## 2025-01-27 PROBLEM — C61 MALIGNANT NEOPLASM OF PROSTATE (H): Status: ACTIVE | Noted: 2023-09-12

## 2025-01-27 PROBLEM — E11.40 TYPE 2 DIABETES MELLITUS WITH DIABETIC NEUROPATHY, UNSPECIFIED (H): Status: ACTIVE | Noted: 2023-09-12

## 2025-01-27 PROBLEM — E11.40 TYPE 2 DIABETES MELLITUS WITH DIABETIC NEUROPATHY, UNSPECIFIED (H): Status: RESOLVED | Noted: 2023-09-12 | Resolved: 2025-01-27

## 2025-01-27 PROBLEM — G82.22 PARAPLEGIA, INCOMPLETE (H): Status: ACTIVE | Noted: 2023-09-12

## 2025-01-27 PROBLEM — C79.51 SECONDARY MALIGNANT NEOPLASM OF BONE (H): Status: ACTIVE | Noted: 2023-09-12

## 2025-02-03 ENCOUNTER — NURSING HOME VISIT (OUTPATIENT)
Dept: GERIATRICS | Facility: CLINIC | Age: 75
End: 2025-02-03
Payer: MEDICARE

## 2025-02-03 VITALS
SYSTOLIC BLOOD PRESSURE: 136 MMHG | BODY MASS INDEX: 27.36 KG/M2 | RESPIRATION RATE: 20 BRPM | TEMPERATURE: 96.9 F | WEIGHT: 169.5 LBS | OXYGEN SATURATION: 96 % | DIASTOLIC BLOOD PRESSURE: 76 MMHG | HEART RATE: 79 BPM

## 2025-02-03 DIAGNOSIS — C61 MALIGNANT NEOPLASM OF PROSTATE METASTATIC TO BONE (H): ICD-10-CM

## 2025-02-03 DIAGNOSIS — I10 PRIMARY HYPERTENSION: ICD-10-CM

## 2025-02-03 DIAGNOSIS — E11.9 TYPE 2 DIABETES MELLITUS WITHOUT COMPLICATION, WITHOUT LONG-TERM CURRENT USE OF INSULIN (H): ICD-10-CM

## 2025-02-03 DIAGNOSIS — G31.84 MILD COGNITIVE IMPAIRMENT: ICD-10-CM

## 2025-02-03 DIAGNOSIS — E04.1 THYROID NODULE: Primary | ICD-10-CM

## 2025-02-03 DIAGNOSIS — G82.22 PARAPLEGIA, INCOMPLETE (H): ICD-10-CM

## 2025-02-03 DIAGNOSIS — C79.51 MALIGNANT NEOPLASM OF PROSTATE METASTATIC TO BONE (H): ICD-10-CM

## 2025-02-03 PROCEDURE — 99309 SBSQ NF CARE MODERATE MDM 30: CPT | Performed by: NURSE PRACTITIONER

## 2025-02-03 RX ORDER — ACETAMINOPHEN 500 MG
1000 TABLET ORAL 2 TIMES DAILY
COMMUNITY

## 2025-02-03 RX ORDER — OXYCODONE HYDROCHLORIDE 5 MG/1
5 TABLET ORAL EVERY 4 HOURS PRN
COMMUNITY

## 2025-02-03 NOTE — PROGRESS NOTES
[facility] GERIATRICS    PRIMARY CARE PROVIDER AND CLINIC:  Nataly Cuadra, APRN CNP, [address] [facility] [address]  Chief Complaint   Patient presents with    Hospital F/U      [place] Medical Record Number:  [id number]  Place of Service where encounter took place:  [facility] () [[id number]]    James Esteban  is a 74 year old  (1950), returned to the above facility from  [facility] . Hospital stay 1/29/25 - 1/30/25.  Patient with PMH prostate cancer with mets to bone, Paraplegia due to mets s/p T9-11 decompressive spinal surgery 10/2022, DM2, HTN, neurogenic bowel and bladder, and cognitive impairment. He was admitted for left hemithyroidectomy for 2.5cm nodule. Discharge summary not available, only H&P from admission day. No complications noted. Losartan was put on hold. Note stated until follow up with primary, and then [facility] in 1-2 weeks.     HPI obtained from patient visit, review of nursing home record, discussion with facility staff, and Epic review.     HPI:    Patient is sitting up in his custom wheelchair watching TV. He says he feels fine. No pain in his neck. He correctly states that he has follow up this week to find out the results from his surgery. He has no concerns or questions today    CODE STATUS/ADVANCE DIRECTIVES DISCUSSION:  No CPR- Do NOT Intubate    ALLERGIES:   Allergies   Allergen Reactions    Nuts Anaphylaxis    Shellfish Allergy Anaphylaxis, Hives and Shortness Of Breath    Cats       PAST MEDICAL HISTORY: No past medical history on file.   PAST SURGICAL HISTORY:   has no past surgical history on file.  FAMILY HISTORY: family history is not on file.  SOCIAL HISTORY:     Patient's living condition: lives in a nursing home    Post Discharge Medication Reconciliation Status:   MED REC REQUIRED  Post Medication Reconciliation Status:  Discharge medications reconciled, continue medications without  change       Current Outpatient Medications   Medication Sig Dispense Refill    acetaminophen (TYLENOL) 500 MG tablet Take 1,000 mg by mouth 2 times daily.      baclofen (LIORESAL) 20 MG tablet Take 20 mg by mouth 2 times daily.      chlorthalidone (HYGROTON) 25 MG tablet Take 12.5 mg by mouth daily.      darolutamide (NUBEQA) 300 MG tablet Take 600 mg by mouth 2 times daily. . Swallow tablets whole with food.      docusate sodium (ENEMEEZ) 283 MG enema Place 1 enema rectally daily.      hydrALAZINE (APRESOLINE) 10 MG tablet Take 10 mg by mouth 3 times daily as needed.      lidocaine (XYLOCAINE) 5 % external ointment Apply topically daily.      lidocaine (XYLOCAINE) external gel as needed for moderate pain.      loratadine (CLARITIN) 10 MG tablet Take 10 mg by mouth daily.      Magnesium Oxide 420 MG TABS Take 1 tablet by mouth 2 times daily.      melatonin 3 MG CAPS Take 6 mg by mouth at bedtime.      metFORMIN (GLUCOPHAGE) 500 MG tablet Take 500 mg by mouth 2 times daily (with meals).      metoprolol succinate ER (TOPROL XL) 25 MG 24 hr tablet Take 25 mg by mouth daily.      miconazole (MICATIN) 2 % external powder Apply topically 2 times daily.      NITROGLYCERIN TRANSDERMAL TD Place onto the skin.      omeprazole (PRILOSEC) 20 MG DR capsule Take 20 mg by mouth daily.      oxyCODONE (ROXICODONE) 5 MG tablet Take 5 mg by mouth every 4 hours as needed for severe pain.      sennosides (SENOKOT) 8.6 MG tablet Take 2 tablets by mouth at bedtime.      tiZANidine (ZANAFLEX) 4 MG capsule Take 4 mg by mouth 3 times daily.       No current facility-administered medications for this visit.       ROS:  4 point ROS including Respiratory, CV, GI and , other than that noted in the HPI,  is negative    Vitals:  /76   Pulse 79   Temp 96.9  F (36.1  C)   Resp 20   Wt 76.9 kg (169 lb 8 oz)   SpO2 96%   BMI 27.36 kg/m    Exam:  GENERAL APPEARANCE:  Alert, in no distress  ENT:  Mouth and posterior oropharynx normal,  moist mucous membranes  NECK:  small dressing on neck, no swelling, erythema, or bruising noted  RESP:  no respiratory distress  CV:  no edema  M/S:   no purposeful movements of BLE  PSYCH:  memory impaired , affect and mood normal      ASSESSMENT/PLAN:  (E04.1) Thyroid nodule  (primary encounter diagnosis)  Comment: Pathology pending. Will look for any notes that are sent after his appointment this week. No s/sx poor healing or infection  Plan: Continue current POC with no changes at this time and adjustments as needed.    (C61,  C79.51) Malignant neoplasm of prostate metastatic to bone (H)  Comment: Stable per [facility] notes. He does have a CT scan scheduled for 2/7/25    (G82.22) Paraplegia, incomplete (H)  Comment: Chronic, no improvement expected. Is totally dependent on staff for all cares.   Plan: Continue current psychological and physical support. Monitor skin integrity, weight and comfort levels.     (I10) Primary hypertension  Comment: Currently controlled.  Plan: Monitor BP. If it becomes uncontrolled, will restart losartan if VA has not    (G31.84) Mild cognitive impairment  Comment: Followed by [facility]. Noted to be likely related to cerebrovascular disease    (E11.9) Type 2 diabetes mellitus without complication, without long-term current use of insulin (H)  Comment: On metformin. A1c 6.4% on 1/7/25 per [facility]  Plan: Continue current POC with no changes at this time and adjustments as needed.          Electronically signed by:  TRINITY Guthrie CNP

## 2025-02-03 NOTE — LETTER
2/3/2025      James Esteban  [facility]  [address]  [place] [address]        [facility] GERIATRICS    PRIMARY CARE PROVIDER AND CLINIC:  TRINITY Guthrie [facility], [address] [facility] [place] [address]  Chief Complaint   Patient presents with     Hospital F/U      [place] Medical Record Number:  [id number]  Place of Service where encounter took place:  [facility] () [[id number]]    James Esteban  is a 74 year old  (1950), returned to the above facility from  [facility] . Hospital stay 1/29/25 - 1/30/25.  Patient with PMH prostate cancer with mets to bone, Paraplegia due to mets s/p T9-11 decompressive spinal surgery 10/2022, DM2, HTN, neurogenic bowel and bladder, and cognitive impairment. He was admitted for left hemithyroidectomy for 2.5cm nodule. Discharge summary not available, only H&P from admission day. No complications noted. Losartan was put on hold. Note stated until follow up with primary, and then they note a [facility] primary MD he should see in 1-2 weeks.     HPI obtained from patient visit, review of nursing home record, discussion with facility staff, and Epic review.     HPI:    Patient is sitting up in his custom wheelchair watching TV. He says he feels fine. No pain in his neck. He correctly states that he has follow up this week to find out the results from his surgery. He has no concerns or questions today    CODE STATUS/ADVANCE DIRECTIVES DISCUSSION:  No CPR- Do NOT Intubate    ALLERGIES:   Allergies   Allergen Reactions     Nuts Anaphylaxis     Shellfish Allergy Anaphylaxis, Hives and Shortness Of Breath     Cats       PAST MEDICAL HISTORY: No past medical history on file.   PAST SURGICAL HISTORY:   has no past surgical history on file.  FAMILY HISTORY: family history is not on file.  SOCIAL HISTORY:     Patient's living condition: lives in a nursing home    Post Discharge Medication Reconciliation Status:   MED REC  REQUIRED  Post Medication Reconciliation Status:  Discharge medications reconciled, continue medications without change       Current Outpatient Medications   Medication Sig Dispense Refill     acetaminophen (TYLENOL) 500 MG tablet Take 1,000 mg by mouth 2 times daily.       baclofen (LIORESAL) 20 MG tablet Take 20 mg by mouth 2 times daily.       chlorthalidone (HYGROTON) 25 MG tablet Take 12.5 mg by mouth daily.       darolutamide (NUBEQA) 300 MG tablet Take 600 mg by mouth 2 times daily. . Swallow tablets whole with food.       docusate sodium (ENEMEEZ) 283 MG enema Place 1 enema rectally daily.       hydrALAZINE (APRESOLINE) 10 MG tablet Take 10 mg by mouth 3 times daily as needed.       lidocaine (XYLOCAINE) 5 % external ointment Apply topically daily.       lidocaine (XYLOCAINE) external gel as needed for moderate pain.       loratadine (CLARITIN) 10 MG tablet Take 10 mg by mouth daily.       Magnesium Oxide 420 MG TABS Take 1 tablet by mouth 2 times daily.       melatonin 3 MG CAPS Take 6 mg by mouth at bedtime.       metFORMIN (GLUCOPHAGE) 500 MG tablet Take 500 mg by mouth 2 times daily (with meals).       metoprolol succinate ER (TOPROL XL) 25 MG 24 hr tablet Take 25 mg by mouth daily.       miconazole (MICATIN) 2 % external powder Apply topically 2 times daily.       NITROGLYCERIN TRANSDERMAL TD Place onto the skin.       omeprazole (PRILOSEC) 20 MG DR capsule Take 20 mg by mouth daily.       oxyCODONE (ROXICODONE) 5 MG tablet Take 5 mg by mouth every 4 hours as needed for severe pain.       sennosides (SENOKOT) 8.6 MG tablet Take 2 tablets by mouth at bedtime.       tiZANidine (ZANAFLEX) 4 MG capsule Take 4 mg by mouth 3 times daily.       No current facility-administered medications for this visit.       ROS:  4 point ROS including Respiratory, CV, GI and , other than that noted in the HPI,  is negative    Vitals:  /76   Pulse 79   Temp 96.9  F (36.1  C)   Resp 20   Wt 76.9 kg (169 lb 8 oz)    SpO2 96%   BMI 27.36 kg/m    Exam:  GENERAL APPEARANCE:  Alert, in no distress  ENT:  Mouth and posterior oropharynx normal, moist mucous membranes  NECK:  small dressing on neck, no swelling, erythema, or bruising noted  RESP:  no respiratory distress  CV:  no edema  M/S:   no purposeful movements of BLE  PSYCH:  memory impaired , affect and mood normal      ASSESSMENT/PLAN:  (E04.1) Thyroid nodule  (primary encounter diagnosis)  Comment: Pathology pending. Will look for any notes that are sent after his appointment this week. No s/sx poor healing or infection  Plan: Continue current POC with no changes at this time and adjustments as needed.    (C61,  C79.51) Malignant neoplasm of prostate metastatic to bone (H)  Comment: Stable per [facility] notes. He does have a CT scan scheduled for 2/7/25    (G82.22) Paraplegia, incomplete (H)  Comment: Chronic, no improvement expected. Is totally dependent on staff for all cares.   Plan: Continue current psychological and physical support. Monitor skin integrity, weight and comfort levels.     (I10) Primary hypertension  Comment: Currently controlled.  Plan: Monitor BP. If it becomes uncontrolled, will restart losartan if VA has not    (G31.84) Mild cognitive impairment  Comment: Followed by [facility]. Noted to be likely related to cerebrovascular disease    (E11.9) Type 2 diabetes mellitus without complication, without long-term current use of insulin (H)  Comment: On metformin. A1c 6.4% on 1/7/25 per [facility]  Plan: Continue current POC with no changes at this time and adjustments as needed.          Electronically signed by:  TRINITY Guthrie CNP                     Sincerely,        TRINITY Guthrie CNP    Electronically signed

## 2025-02-04 ENCOUNTER — NURSING HOME VISIT (OUTPATIENT)
Dept: GERIATRICS | Facility: CLINIC | Age: 75
End: 2025-02-04
Payer: MEDICARE

## 2025-02-04 VITALS
BODY MASS INDEX: 27.24 KG/M2 | SYSTOLIC BLOOD PRESSURE: 136 MMHG | OXYGEN SATURATION: 96 % | DIASTOLIC BLOOD PRESSURE: 76 MMHG | TEMPERATURE: 96.9 F | HEART RATE: 79 BPM | RESPIRATION RATE: 20 BRPM | WEIGHT: 169.5 LBS | HEIGHT: 66 IN

## 2025-02-04 DIAGNOSIS — E11.9 TYPE 2 DIABETES MELLITUS WITHOUT COMPLICATION, WITHOUT LONG-TERM CURRENT USE OF INSULIN (H): ICD-10-CM

## 2025-02-04 DIAGNOSIS — K21.9 GASTROESOPHAGEAL REFLUX DISEASE WITHOUT ESOPHAGITIS: ICD-10-CM

## 2025-02-04 DIAGNOSIS — C79.51 MALIGNANT NEOPLASM OF PROSTATE METASTATIC TO BONE (H): Primary | ICD-10-CM

## 2025-02-04 DIAGNOSIS — G82.22 PARAPLEGIA, INCOMPLETE (H): ICD-10-CM

## 2025-02-04 DIAGNOSIS — G31.84 MILD COGNITIVE IMPAIRMENT: ICD-10-CM

## 2025-02-04 DIAGNOSIS — I10 PRIMARY HYPERTENSION: ICD-10-CM

## 2025-02-04 DIAGNOSIS — C61 MALIGNANT NEOPLASM OF PROSTATE METASTATIC TO BONE (H): Primary | ICD-10-CM

## 2025-02-04 DIAGNOSIS — N31.9 NEUROGENIC BLADDER: ICD-10-CM

## 2025-02-04 DIAGNOSIS — K59.2 NEUROGENIC BOWEL: ICD-10-CM

## 2025-02-04 PROCEDURE — 99305 1ST NF CARE MODERATE MDM 35: CPT | Performed by: INTERNAL MEDICINE

## 2025-02-04 NOTE — LETTER
2/4/2025      James Esteban  [facility]  [address]  [place] [address]        [facility] GERIATRICS  INITIAL VISIT NOTE  February 4, 2025      PRIMARY CARE PROVIDER AND CLINIC:  Nataly Cuadra [address] [facility] [address]    [facility] Medical Record Number:  [id number]  Place of Service where encounter took place:  [facility] () [[id number]]    Chief Complaint   Patient presents with     Establish Care       HPI:    James Esteban is a 74 year old  (1950) male seen today at [facility]. Medical history is notable for metastatic prostate cancer with axial skeletal metastasis with incomplete paraplegia s/p T9-10 decompressive laminectomy, autonomic dysreflexia, neurogenic bowel and bladder, DM and chronic pain as well as recent left hemithyroidectomy for thyroid nodule.  He was admitted to this facility from the [facility].  [facility].  In reviewing available documentation, the hemithyroidectomy was on 1/29/2025 and was without apparent complication.  He was admitted to this facility for  rehab, medical management, and nursing care.      History obtained from: facility chart records, facility staff, patient report, [facility] chart review, Care Everywhere [facility] chart review, and (Limited [facility] records available through the facility) .      Today, Mr. Esteban is seen in his room sitting in his power chair. No specific questions or concerns today - introduced myself and role of the in house medical team. He notes the anterior neck incision is healing well. Bowel program is going OK. No acute concerns today per discussion with nursing.     CODE STATUS: DNR / DNI    ALLERGIES:  Allergies   Allergen Reactions     Nuts Anaphylaxis     Shellfish Allergy Anaphylaxis, Hives and Shortness Of Breath     Cats        PAST MEDICAL HISTORY:   No past medical history on file.      SOCIAL HISTORY:   Patient's  "living condition: lives in a skilled nursing facility    MEDICATIONS:  Post Discharge Medication Reconciliation Status: discharge medications reconciled and changed, per note/orders.  Current Outpatient Medications   Medication Sig Dispense Refill     acetaminophen (TYLENOL) 500 MG tablet Take 1,000 mg by mouth 2 times daily.       baclofen (LIORESAL) 20 MG tablet Take 20 mg by mouth 2 times daily.       chlorthalidone (HYGROTON) 25 MG tablet Take 12.5 mg by mouth daily.       darolutamide (NUBEQA) 300 MG tablet Take 600 mg by mouth 2 times daily. . Swallow tablets whole with food.       docusate sodium (ENEMEEZ) 283 MG enema Place 1 enema rectally daily.       hydrALAZINE (APRESOLINE) 10 MG tablet Take 10 mg by mouth 3 times daily as needed.       lidocaine (XYLOCAINE) 5 % external ointment Apply topically daily.       lidocaine (XYLOCAINE) external gel as needed for moderate pain.       loratadine (CLARITIN) 10 MG tablet Take 10 mg by mouth daily.       Magnesium Oxide 420 MG TABS Take 1 tablet by mouth 2 times daily.       melatonin 3 MG CAPS Take 6 mg by mouth at bedtime.       metFORMIN (GLUCOPHAGE) 500 MG tablet Take 500 mg by mouth 2 times daily (with meals).       metoprolol succinate ER (TOPROL XL) 25 MG 24 hr tablet Take 25 mg by mouth daily.       miconazole (MICATIN) 2 % external powder Apply topically 2 times daily.       NITROGLYCERIN TRANSDERMAL TD Place onto the skin.       omeprazole (PRILOSEC) 20 MG DR capsule Take 20 mg by mouth daily.       oxyCODONE (ROXICODONE) 5 MG tablet Take 5 mg by mouth every 4 hours as needed for severe pain.       sennosides (SENOKOT) 8.6 MG tablet Take 2 tablets by mouth at bedtime.       tiZANidine (ZANAFLEX) 4 MG capsule Take 4 mg by mouth 3 times daily.         ROS:  2 point ROS neg other than the symptoms noted above in the HPI.    PHYSICAL EXAM:  /76   Pulse 79   Temp 96.9  F (36.1  C)   Resp 20   Ht 1.676 m (5' 6\")   Wt 76.9 kg (169 lb 8 oz)   SpO2 96% "   BMI 27.36 kg/m    Gen: sitting in power wheelchair, alert, cooperative and in no acute distress  Resp: breathing non labored, no tachypnea   Ext: no LE edema  Neuro: CX II-XII grossly in tact; ROM in upper extremities grossly in tact  Psych: alert and oriented to self and general situation     LABORATORY/IMAGING DATA:  Reviewed as per [facility] and/or [facility]    ASSESSMENT/PLAN:    Metastatic Prostate Cancer  With bone metastases  -- darolutamide 600  mg BID   -- Follow-up at the [facility] as scheduled    Thyroid Nodule s/p Hemithyroidectomy (1/29/25)  Incision healing well  -- Follow-up at the [facility] as scheduled    Paraplegia s/p T9-11 Decompressive Laminectomy  Autonomic Dysreflexia  Secondary to above.  Ambulates with a power chair   -- Baclofen 20 mg BID, tizanidine 4 mg TID  -- Nitropaste available for autonomic dysreflexia  -- Be cognizant of condition and risk for wound development  -- Ongoing 24/7 nursing and supportive cares    Neurogenic Bowel  Neurogenic Bladder  Secondary to above  --Docusate mini enema daily, senna 2 tabs at bedtime   --Bowel and bladder program as ordered    HTN  SBPs 130s. HR 70s. Weight 170 lbs.   -- Chlorthalidone 12.5 mg daily, metoprolol XL 25 mg daily  --Hydralazine 10 mg TID PRN    Mild Cognitive Impairment  Noted in [facility] records  -- ongoing 24/7 nursing and supportive cares    DM, Type II  No A1c available  -- Metformin 500 mg BID  -- Check sugars PRN    GERD  -- Omeprazole 20 mg daily    Electronically signed by:  Blank Cabrera MD                          Sincerely,        Blank Cabrera MD    Electronically signed

## 2025-02-04 NOTE — PROGRESS NOTES
[facility] GERIATRICS  INITIAL VISIT NOTE  February 4, 2025      PRIMARY CARE PROVIDER AND CLINIC:  Nataly Cuadra [address] [facility] [address]    [facility] Medical Record Number:  [id number]  Place of Service where encounter took place:  [facility] () [[id number]]    Chief Complaint   Patient presents with    [facility] Care       HPI:    James Esteban is a 74 year old  (1950) male seen today at [facility]. Medical history is notable for metastatic prostate cancer with axial skeletal metastasis with incomplete paraplegia s/p T9-10 decompressive laminectomy, autonomic dysreflexia, neurogenic bowel and bladder, DM and chronic pain as well as recent left hemithyroidectomy for thyroid nodule.  He was admitted to this facility from the [facility].  [facility].  In reviewing available documentation, the hemithyroidectomy was on 1/29/2025 and was without apparent complication.  He was admitted to this facility for  rehab, medical management, and nursing care.      History obtained from: facility chart records, facility staff, patient report, [facility] chart review, Care Everywhere [facility] chart review, and (Limited [facility] records available through the facility) .      Today, Mr. Esteban is seen in his room sitting in his power chair. No specific questions or concerns today - introduced myself and role of the in house medical team. He notes the anterior neck incision is healing well. Bowel program is going OK. No acute concerns today per discussion with nursing.     CODE STATUS: DNR / DNI    ALLERGIES:  Allergies   Allergen Reactions    Nuts Anaphylaxis    Shellfish Allergy Anaphylaxis, Hives and Shortness Of Breath    Cats        PAST MEDICAL HISTORY:   No past medical history on file.      SOCIAL HISTORY:   Patient's living condition: lives in a skilled nursing facility    MEDICATIONS:  Post Discharge Medication  "Reconciliation Status: discharge medications reconciled and changed, per note/orders.  Current Outpatient Medications   Medication Sig Dispense Refill    acetaminophen (TYLENOL) 500 MG tablet Take 1,000 mg by mouth 2 times daily.      baclofen (LIORESAL) 20 MG tablet Take 20 mg by mouth 2 times daily.      chlorthalidone (HYGROTON) 25 MG tablet Take 12.5 mg by mouth daily.      darolutamide (NUBEQA) 300 MG tablet Take 600 mg by mouth 2 times daily. . Swallow tablets whole with food.      docusate sodium (ENEMEEZ) 283 MG enema Place 1 enema rectally daily.      hydrALAZINE (APRESOLINE) 10 MG tablet Take 10 mg by mouth 3 times daily as needed.      lidocaine (XYLOCAINE) 5 % external ointment Apply topically daily.      lidocaine (XYLOCAINE) external gel as needed for moderate pain.      loratadine (CLARITIN) 10 MG tablet Take 10 mg by mouth daily.      Magnesium Oxide 420 MG TABS Take 1 tablet by mouth 2 times daily.      melatonin 3 MG CAPS Take 6 mg by mouth at bedtime.      metFORMIN (GLUCOPHAGE) 500 MG tablet Take 500 mg by mouth 2 times daily (with meals).      metoprolol succinate ER (TOPROL XL) 25 MG 24 hr tablet Take 25 mg by mouth daily.      miconazole (MICATIN) 2 % external powder Apply topically 2 times daily.      NITROGLYCERIN TRANSDERMAL TD Place onto the skin.      omeprazole (PRILOSEC) 20 MG DR capsule Take 20 mg by mouth daily.      oxyCODONE (ROXICODONE) 5 MG tablet Take 5 mg by mouth every 4 hours as needed for severe pain.      sennosides (SENOKOT) 8.6 MG tablet Take 2 tablets by mouth at bedtime.      tiZANidine (ZANAFLEX) 4 MG capsule Take 4 mg by mouth 3 times daily.         ROS:  2 point ROS neg other than the symptoms noted above in the HPI.    PHYSICAL EXAM:  /76   Pulse 79   Temp 96.9  F (36.1  C)   Resp 20   Ht 1.676 m (5' 6\")   Wt 76.9 kg (169 lb 8 oz)   SpO2 96%   BMI 27.36 kg/m    Gen: sitting in power wheelchair, alert, cooperative and in no acute distress  Resp: breathing " non labored, no tachypnea   Ext: no LE edema  Neuro: CX II-XII grossly in tact; ROM in upper extremities grossly in tact  Psych: alert and oriented to self and general situation     LABORATORY/IMAGING DATA:  Reviewed as per [facility] and/or [facility]    ASSESSMENT/PLAN:    Metastatic Prostate Cancer  With bone metastases  -- darolutamide 600  mg BID   -- Follow-up at the [facility] as scheduled    Thyroid Nodule s/p Hemithyroidectomy (1/29/25)  Incision healing well  -- Follow-up at the [facility] as scheduled    Paraplegia s/p T9-11 Decompressive Laminectomy  Autonomic Dysreflexia  Secondary to above.  Ambulates with a power chair   -- Baclofen 20 mg BID, tizanidine 4 mg TID  -- Nitropaste available for autonomic dysreflexia  -- Be cognizant of condition and risk for wound development  -- Ongoing 24/7 nursing and supportive cares    Neurogenic Bowel  Neurogenic Bladder  Secondary to above  --Docusate mini enema daily, senna 2 tabs at bedtime   --Bowel and bladder program as ordered    HTN  SBPs 130s. HR 70s. Weight 170 lbs.   -- Chlorthalidone 12.5 mg daily, metoprolol XL 25 mg daily  --Hydralazine 10 mg TID PRN    Mild Cognitive Impairment  Noted in [facility] records  -- ongoing 24/7 nursing and supportive cares    DM, Type II  No A1c available  -- Metformin 500 mg BID  -- Check sugars PRN    GERD  -- Omeprazole 20 mg daily    Electronically signed by:  Blank Cabrera MD

## 2025-03-06 ENCOUNTER — NURSING HOME VISIT (OUTPATIENT)
Dept: GERIATRICS | Facility: CLINIC | Age: 75
End: 2025-03-06
Payer: MEDICARE

## 2025-03-06 VITALS
RESPIRATION RATE: 18 BRPM | TEMPERATURE: 97.4 F | SYSTOLIC BLOOD PRESSURE: 126 MMHG | DIASTOLIC BLOOD PRESSURE: 74 MMHG | BODY MASS INDEX: 26.18 KG/M2 | HEIGHT: 66 IN | HEART RATE: 79 BPM | WEIGHT: 162.9 LBS

## 2025-03-06 DIAGNOSIS — E04.1 THYROID NODULE: ICD-10-CM

## 2025-03-06 DIAGNOSIS — I10 PRIMARY HYPERTENSION: ICD-10-CM

## 2025-03-06 DIAGNOSIS — G82.22 PARAPLEGIA, INCOMPLETE (H): Primary | ICD-10-CM

## 2025-03-06 DIAGNOSIS — C61 MALIGNANT NEOPLASM OF PROSTATE METASTATIC TO BONE (H): ICD-10-CM

## 2025-03-06 DIAGNOSIS — E11.9 TYPE 2 DIABETES MELLITUS WITHOUT COMPLICATION, WITHOUT LONG-TERM CURRENT USE OF INSULIN (H): ICD-10-CM

## 2025-03-06 DIAGNOSIS — C79.51 MALIGNANT NEOPLASM OF PROSTATE METASTATIC TO BONE (H): ICD-10-CM

## 2025-03-06 NOTE — PROGRESS NOTES
[facility] GERIATRICS  Chief Complaint   Patient presents with    skilled nursing Regulatory     [place] Medical Record Number:  [id number]  Place of Service where encounter took place:  [facility] () [[id number]]    HPI:    James Esteban  is 74 year old (1950), who is being seen today for a federally mandated E/M visit.     Today's concerns are:  Paraplegia, incomplete (H)  Malignant neoplasm of prostate metastatic to bone (H)  Type 2 diabetes mellitus without complication, without long-term current use of insulin (H)  Primary hypertension  Thyroid nodule  [facility]. He had a bone scan done 2/7, but there are no results or notes regarding this scanned into the record. He says he is doing ok. He denies any pain. He is feeling fine.       ALLERGIES:Nuts, Shellfish allergy, and Cats  PAST MEDICAL HISTORY: No past medical history on file.  PAST SURGICAL HISTORY:   has no past surgical history on file.  FAMILY HISTORY: family history is not on file.  SOCIAL HISTORY:      MEDICATIONS:  Current Outpatient Medications   Medication Sig Dispense Refill    acetaminophen (TYLENOL) 500 MG tablet Take 1,000 mg by mouth 2 times daily.      baclofen (LIORESAL) 20 MG tablet Take 20 mg by mouth 2 times daily.      chlorthalidone (HYGROTON) 25 MG tablet Take 12.5 mg by mouth daily.      darolutamide (NUBEQA) 300 MG tablet Take 600 mg by mouth 2 times daily. . Swallow tablets whole with food.      docusate sodium (ENEMEEZ) 283 MG enema Place 1 enema rectally daily.      hydrALAZINE (APRESOLINE) 10 MG tablet Take 10 mg by mouth 3 times daily as needed.      lidocaine (XYLOCAINE) 5 % external ointment Apply topically daily.      lidocaine (XYLOCAINE) external gel as needed for moderate pain.      loratadine (CLARITIN) 10 MG tablet Take 10 mg by mouth daily.      Magnesium Oxide 420 MG TABS Take 1 tablet by mouth 2 times daily.      melatonin 3 MG CAPS Take 6  "mg by mouth at bedtime.      metFORMIN (GLUCOPHAGE) 500 MG tablet Take 500 mg by mouth 2 times daily (with meals).      metoprolol succinate ER (TOPROL XL) 25 MG 24 hr tablet Take 25 mg by mouth daily.      miconazole (MICATIN) 2 % external powder Apply topically 2 times daily.      NITROGLYCERIN TRANSDERMAL TD Place onto the skin.      omeprazole (PRILOSEC) 20 MG DR capsule Take 20 mg by mouth daily.      oxyCODONE (ROXICODONE) 5 MG tablet Take 5 mg by mouth every 4 hours as needed for severe pain.      sennosides (SENOKOT) 8.6 MG tablet Take 2 tablets by mouth at bedtime.      tiZANidine (ZANAFLEX) 4 MG capsule Take 4 mg by mouth 3 times daily.         Case Management:  I have reviewed the care plan and MDS and do agree with the plan. Information reviewed:  Medications, vital signs, orders, and nursing notes.    ROS:  4 point ROS including Respiratory, CV, GI and , other than that noted in the HPI,  is negative    Vitals:  /74   Pulse 79   Temp 97.4  F (36.3  C)   Resp 18   Ht 1.676 m (5' 6\")   Wt 73.9 kg (162 lb 14.4 oz)   BMI 26.29 kg/m    Body mass index is 26.29 kg/m .  Wt Readings from Last 4 Encounters:   03/06/25 73.9 kg (162 lb 14.4 oz)   02/04/25 76.9 kg (169 lb 8 oz)   02/03/25 76.9 kg (169 lb 8 oz)   01/24/25 76.9 kg (169 lb 8 oz)       Exam:  GENERAL APPEARANCE:  Alert, in no distress  EYES:  EOM, conjunctivae, lids, pupils and irises normal  RESP:  no respiratory distress  CV:  no edema  SKIN:  healing surgical wound anterior neck  NEURO:   no purposeful movements of legs, no tremors  PSYCH:  oriented X 3, affect and mood normal    Lab/Diagnostic data:   [facility] labs not [facility]    ASSESSMENT/PLAN  (G82.22) Paraplegia, incomplete (H)  (primary encounter diagnosis)  (C61,  C79.51) Malignant neoplasm of prostate metastatic to bone (H)  Comment: Status unknown. Noted that patient has lost 7 lbs since admission. Will need to monitor for progressive weight loss or any evidence of decline. " Patient is dependent for ADLs, requires 24 hour care  Plan:  Continue current psychological and physical support. Monitor skin integrity, weight and comfort levels. Refer to advanced care plan/POLST.    (E11.9) Type 2 diabetes mellitus without complication, without long-term current use of insulin (H)  Comment: On metformin only. No blood sugar checks. Will check A1c this month    (I10) Primary hypertension  Comment: Chronic controlled  Plan: Continue current POC with no changes at this time and adjustments as needed.    (E04.1) Thyroid nodule  Comment: s/p surgical resection. Unknown if this was malignant. Will check thyroid labs in order to have them on file.       Orders:  A1c, BMP, CBC, TSH, free T4 3/19/25      Electronically signed by:  TRINITY Guthrie CNP

## 2025-03-06 NOTE — LETTER
3/6/2025      James Esteban  [facility]  [address]  [facility] [place] [address]        [facility] GERIATRICS  Chief Complaint   Patient presents with     shelter Regulatory     [place] Medical Record Number:  [id number]  Place of Service where encounter took place:  [facility] () [[id number]]    HPI:    James Esteban  is 74 year old (1950), who is being seen today for a federally mandated E/M visit.     Today's concerns are:  Paraplegia, incomplete (H)  Malignant neoplasm of prostate metastatic to bone (H)  Type 2 diabetes mellitus without complication, without long-term current use of insulin (H)  Primary hypertension  Thyroid nodule  [facility]. He had a bone scan done 2/7, but there are no results or notes regarding this scanned into the record. He says he is doing ok. He denies any pain. He is feeling fine.       ALLERGIES:Nuts, Shellfish allergy, and Cats  PAST MEDICAL HISTORY: No past medical history on file.  PAST SURGICAL HISTORY:   has no past surgical history on file.  FAMILY HISTORY: family history is not on file.  SOCIAL HISTORY:      MEDICATIONS:  Current Outpatient Medications   Medication Sig Dispense Refill     acetaminophen (TYLENOL) 500 MG tablet Take 1,000 mg by mouth 2 times daily.       baclofen (LIORESAL) 20 MG tablet Take 20 mg by mouth 2 times daily.       chlorthalidone (HYGROTON) 25 MG tablet Take 12.5 mg by mouth daily.       darolutamide (NUBEQA) 300 MG tablet Take 600 mg by mouth 2 times daily. . Swallow tablets whole with food.       docusate sodium (ENEMEEZ) 283 MG enema Place 1 enema rectally daily.       hydrALAZINE (APRESOLINE) 10 MG tablet Take 10 mg by mouth 3 times daily as needed.       lidocaine (XYLOCAINE) 5 % external ointment Apply topically daily.       lidocaine (XYLOCAINE) external gel as needed for moderate pain.       loratadine (CLARITIN) 10 MG tablet Take 10 mg by  "mouth daily.       Magnesium Oxide 420 MG TABS Take 1 tablet by mouth 2 times daily.       melatonin 3 MG CAPS Take 6 mg by mouth at bedtime.       metFORMIN (GLUCOPHAGE) 500 MG tablet Take 500 mg by mouth 2 times daily (with meals).       metoprolol succinate ER (TOPROL XL) 25 MG 24 hr tablet Take 25 mg by mouth daily.       miconazole (MICATIN) 2 % external powder Apply topically 2 times daily.       NITROGLYCERIN TRANSDERMAL TD Place onto the skin.       omeprazole (PRILOSEC) 20 MG DR capsule Take 20 mg by mouth daily.       oxyCODONE (ROXICODONE) 5 MG tablet Take 5 mg by mouth every 4 hours as needed for severe pain.       sennosides (SENOKOT) 8.6 MG tablet Take 2 tablets by mouth at bedtime.       tiZANidine (ZANAFLEX) 4 MG capsule Take 4 mg by mouth 3 times daily.         Case Management:  I have reviewed the care plan and MDS and do agree with the plan. Information reviewed:  Medications, vital signs, orders, and nursing notes.    ROS:  4 point ROS including Respiratory, CV, GI and , other than that noted in the HPI,  is negative    Vitals:  /74   Pulse 79   Temp 97.4  F (36.3  C)   Resp 18   Ht 1.676 m (5' 6\")   Wt 73.9 kg (162 lb 14.4 oz)   BMI 26.29 kg/m    Body mass index is 26.29 kg/m .  Wt Readings from Last 4 Encounters:   03/06/25 73.9 kg (162 lb 14.4 oz)   02/04/25 76.9 kg (169 lb 8 oz)   02/03/25 76.9 kg (169 lb 8 oz)   01/24/25 76.9 kg (169 lb 8 oz)       Exam:  GENERAL APPEARANCE:  Alert, in no distress  EYES:  EOM, conjunctivae, lids, pupils and irises normal  RESP:  no respiratory distress  CV:  no edema  SKIN:  healing surgical wound anterior neck  NEURO:   no purposeful movements of legs, no tremors  PSYCH:  oriented X 3, affect and mood normal    Lab/Diagnostic data:   [facility] labs not available    ASSESSMENT/PLAN  (G82.22) Paraplegia, incomplete (H)  (primary encounter diagnosis)  (C61,  C79.51) Malignant neoplasm of prostate metastatic to bone (H)  Comment: Status unknown. Noted " that patient has lost 7 lbs since admission. Will need to monitor for progressive weight loss or any evidence of decline. Patient is dependent for ADLs, requires 24 hour care  Plan:  Continue current psychological and physical support. Monitor skin integrity, weight and comfort levels. Refer to advanced care plan/POLST.    (E11.9) Type 2 diabetes mellitus without complication, without long-term current use of insulin (H)  Comment: On metformin only. No blood sugar checks. Will check A1c this month    (I10) Primary hypertension  Comment: Chronic controlled  Plan: Continue current POC with no changes at this time and adjustments as needed.    (E04.1) Thyroid nodule  Comment: s/p surgical resection. Unknown if this was malignant. Will check thyroid labs in order to have them on file.       Orders:  A1c, BMP, CBC, TSH, free T4 3/19/25      Electronically signed by:  TRINITY Guthrie CNP           Sincerely,        TRINITY Guthrie CNP    Electronically signed

## 2025-03-17 ENCOUNTER — LAB REQUISITION (OUTPATIENT)
Dept: LAB | Facility: CLINIC | Age: 75
End: 2025-03-17
Payer: MEDICARE

## 2025-03-17 DIAGNOSIS — E11.8 TYPE 2 DIABETES MELLITUS WITH UNSPECIFIED COMPLICATIONS (H): ICD-10-CM

## 2025-03-17 DIAGNOSIS — E07.9 DISORDER OF THYROID, UNSPECIFIED: ICD-10-CM

## 2025-03-19 LAB
ANION GAP SERPL CALCULATED.3IONS-SCNC: 15 MMOL/L (ref 7–15)
BUN SERPL-MCNC: 14.4 MG/DL (ref 8–23)
CALCIUM SERPL-MCNC: 9.5 MG/DL (ref 8.8–10.4)
CHLORIDE SERPL-SCNC: 101 MMOL/L (ref 98–107)
CREAT SERPL-MCNC: 0.77 MG/DL (ref 0.67–1.17)
EGFRCR SERPLBLD CKD-EPI 2021: >90 ML/MIN/1.73M2
ERYTHROCYTE [DISTWIDTH] IN BLOOD BY AUTOMATED COUNT: 17.2 % (ref 10–15)
EST. AVERAGE GLUCOSE BLD GHB EST-MCNC: 157 MG/DL
GLUCOSE SERPL-MCNC: 88 MG/DL (ref 70–99)
HBA1C MFR BLD: 7.1 %
HCO3 SERPL-SCNC: 24 MMOL/L (ref 22–29)
HCT VFR BLD AUTO: 37.2 % (ref 40–53)
HGB BLD-MCNC: 11.1 G/DL (ref 13.3–17.7)
MCH RBC QN AUTO: 21 PG (ref 26.5–33)
MCHC RBC AUTO-ENTMCNC: 29.8 G/DL (ref 31.5–36.5)
MCV RBC AUTO: 71 FL (ref 78–100)
PLATELET # BLD AUTO: 294 10E3/UL (ref 150–450)
POTASSIUM SERPL-SCNC: 3.9 MMOL/L (ref 3.4–5.3)
RBC # BLD AUTO: 5.28 10E6/UL (ref 4.4–5.9)
SODIUM SERPL-SCNC: 140 MMOL/L (ref 135–145)
T4 FREE SERPL-MCNC: 1.29 NG/DL (ref 0.9–1.7)
TSH SERPL DL<=0.005 MIU/L-ACNC: 1.83 UIU/ML (ref 0.3–4.2)
WBC # BLD AUTO: 6.7 10E3/UL (ref 4–11)

## 2025-03-19 PROCEDURE — 84439 ASSAY OF FREE THYROXINE: CPT | Mod: ORL | Performed by: INTERNAL MEDICINE

## 2025-03-19 PROCEDURE — 36415 COLL VENOUS BLD VENIPUNCTURE: CPT | Mod: ORL | Performed by: INTERNAL MEDICINE

## 2025-03-19 PROCEDURE — P9603 ONE-WAY ALLOW PRORATED MILES: HCPCS | Mod: ORL | Performed by: INTERNAL MEDICINE

## 2025-03-19 PROCEDURE — 84443 ASSAY THYROID STIM HORMONE: CPT | Mod: ORL | Performed by: INTERNAL MEDICINE

## 2025-03-19 PROCEDURE — 80048 BASIC METABOLIC PNL TOTAL CA: CPT | Mod: ORL | Performed by: INTERNAL MEDICINE

## 2025-03-19 PROCEDURE — 83036 HEMOGLOBIN GLYCOSYLATED A1C: CPT | Mod: ORL | Performed by: INTERNAL MEDICINE

## 2025-03-19 PROCEDURE — 85027 COMPLETE CBC AUTOMATED: CPT | Mod: ORL | Performed by: INTERNAL MEDICINE

## 2025-04-09 ENCOUNTER — NURSING HOME VISIT (OUTPATIENT)
Dept: GERIATRICS | Facility: CLINIC | Age: 75
End: 2025-04-09
Payer: MEDICARE

## 2025-04-09 VITALS
DIASTOLIC BLOOD PRESSURE: 78 MMHG | HEIGHT: 66 IN | HEART RATE: 78 BPM | WEIGHT: 164.5 LBS | TEMPERATURE: 97.8 F | RESPIRATION RATE: 17 BRPM | BODY MASS INDEX: 26.44 KG/M2 | OXYGEN SATURATION: 96 % | SYSTOLIC BLOOD PRESSURE: 134 MMHG

## 2025-04-09 RX ORDER — LIDOCAINE HYDROCHLORIDE 20 MG/ML
JELLY TOPICAL 3 TIMES DAILY PRN
COMMUNITY

## 2025-04-09 NOTE — PROGRESS NOTES
[facility] GERIATRICS  REGULATORY VISIT  April 9, 2025      [facility] Medical Record Number:  [id number]  Place of Service where encounter took place:  [facility] () [[id number]]    Chief Complaint   Patient presents with    halfway Regulatory       HPI:    James Esteban is a 74 year old  (1950), who is being seen today for a federally mandated E/M visit. HPI information obtained from: {FGS HPI:273705}.    Today, ***      ALLERGIES:    Allergies   Allergen Reactions    Nuts Anaphylaxis    Shellfish Allergy Anaphylaxis, Hives and Shortness Of Breath    Cats         Past Medical, Surgical, Family and Social History: Reviewed and updated in EPIC.    MEDICATIONS:  Post Discharge Medication Reconciliation Status: {ACO Med Rec (Provider):536498}. ***    Current Outpatient Medications   Medication Sig Dispense Refill    lidocaine (XYLOCAINE) external gel Place into the urethra 3 times daily as needed for moderate pain.      acetaminophen (TYLENOL) 500 MG tablet Take 1,000 mg by mouth 2 times daily.      baclofen (LIORESAL) 20 MG tablet Take 20 mg by mouth 2 times daily.      chlorthalidone (HYGROTON) 25 MG tablet Take 12.5 mg by mouth daily.      darolutamide (NUBEQA) 300 MG tablet Take 600 mg by mouth 2 times daily. . Swallow tablets whole with food.      docusate sodium (ENEMEEZ) 283 MG enema Place 1 enema rectally daily.      hydrALAZINE (APRESOLINE) 10 MG tablet Take 10 mg by mouth 3 times daily as needed.      lidocaine (XYLOCAINE) 5 % external ointment Apply topically daily.      loratadine (CLARITIN) 10 MG tablet Take 10 mg by mouth daily.      Magnesium Oxide 420 MG TABS Take 1 tablet by mouth 2 times daily.      melatonin 3 MG CAPS Take 6 mg by mouth at bedtime.      metFORMIN (GLUCOPHAGE) 500 MG tablet Take 500 mg by mouth 2 times daily (with meals).      metoprolol succinate ER (TOPROL XL) 25 MG 24 hr tablet Take 25 mg by mouth daily.      miconazole (MICATIN) 2 %  "external powder Apply topically 2 times daily.      NITROGLYCERIN TRANSDERMAL TD Place onto the skin.      omeprazole (PRILOSEC) 20 MG DR capsule Take 20 mg by mouth daily.      oxyCODONE (ROXICODONE) 5 MG tablet Take 5 mg by mouth every 4 hours as needed for severe pain.      sennosides (SENOKOT) 8.6 MG tablet Take 2 tablets by mouth at bedtime.      tiZANidine (ZANAFLEX) 4 MG capsule Take 4 mg by mouth 3 times daily.       Medications reviewed:  Medications reconciled to facility chart and changes were made to reflect current medications as identified as above med list. Below are the changes that were made:   Medications stopped since last EPIC medication reconciliation:   Medications Discontinued During This Encounter   Medication Reason    lidocaine (XYLOCAINE) external gel Med Rec(No AVS / No eCancel)     Medications started since last [facility] medication reconciliation:  Orders Placed This Encounter   Medications    lidocaine (XYLOCAINE) external gel     Sig: Place into the urethra 3 times daily as needed for moderate pain.     ***    REVIEW OF SYSTEMS:  4 point ROS neg other than the symptoms noted above in the HPI.***  Unable to be obtained due to cognitive impairment or aphasia.     PHYSICAL EXAM:  /78   Pulse 78   Temp 97.8  F (36.6  C)   Resp 17   Ht 1.676 m (5' 6\")   Wt 74.6 kg (164 lb 8 oz)   SpO2 96%   BMI 26.55 kg/m    Gen: sitting in bed ***wheelchair, alert, cooperative and in no acute distress  HEENT: *** hearing acuity  Card: RRR, S1, S2, no murmurs  Resp: lungs clear to auscultation bilaterally ***anteriorly and laterally, no crackles or wheezes; moving good air  GI: abdomen soft, not-tender, non-distended, +BS  Ext: no LE edema  Neuro: CX II-XII grossly in tact; ROM in all four extremities grossly in tact  Psych: alert and oriented x3; normal affect ***memory, judgement and insight impaired ***alert and oriented to self and general situation    LABS/IMAGING: Reviewed as per Epic and/or " [facility]    ASSESSMENT / PLAN:  {FGS DX:598507}    Orders:  ***    Electronically signed by  Blank Cabrera MD

## 2025-04-09 NOTE — LETTER
4/9/2025      James Esteban  [facility]  [address]  [facility] [address]        No notes on file      Sincerely,        Blank Cabrera MD    Electronically signed

## 2025-06-03 ENCOUNTER — NURSING HOME VISIT (OUTPATIENT)
Dept: GERIATRICS | Facility: CLINIC | Age: 75
End: 2025-06-03
Payer: MEDICARE

## 2025-06-03 VITALS
SYSTOLIC BLOOD PRESSURE: 118 MMHG | RESPIRATION RATE: 18 BRPM | HEIGHT: 66 IN | BODY MASS INDEX: 25.76 KG/M2 | OXYGEN SATURATION: 98 % | HEART RATE: 73 BPM | DIASTOLIC BLOOD PRESSURE: 79 MMHG | WEIGHT: 160.3 LBS | TEMPERATURE: 97.3 F

## 2025-06-03 DIAGNOSIS — N31.9 NEUROGENIC BLADDER: ICD-10-CM

## 2025-06-03 DIAGNOSIS — I10 PRIMARY HYPERTENSION: ICD-10-CM

## 2025-06-03 DIAGNOSIS — E11.9 TYPE 2 DIABETES MELLITUS WITHOUT COMPLICATION, WITHOUT LONG-TERM CURRENT USE OF INSULIN (H): ICD-10-CM

## 2025-06-03 DIAGNOSIS — Z00.00 ENCOUNTER FOR MEDICARE ANNUAL WELLNESS EXAM: ICD-10-CM

## 2025-06-03 DIAGNOSIS — C79.51 MALIGNANT NEOPLASM OF PROSTATE METASTATIC TO BONE (H): ICD-10-CM

## 2025-06-03 DIAGNOSIS — K59.2 NEUROGENIC BOWEL: ICD-10-CM

## 2025-06-03 DIAGNOSIS — C61 MALIGNANT NEOPLASM OF PROSTATE METASTATIC TO BONE (H): ICD-10-CM

## 2025-06-03 DIAGNOSIS — G82.22 PARAPLEGIA, INCOMPLETE (H): Primary | ICD-10-CM

## 2025-06-03 PROCEDURE — G0438 PPPS, INITIAL VISIT: HCPCS | Performed by: NURSE PRACTITIONER

## 2025-06-03 PROCEDURE — 99309 SBSQ NF CARE MODERATE MDM 30: CPT | Mod: 25 | Performed by: NURSE PRACTITIONER

## 2025-06-03 SDOH — HEALTH STABILITY: PHYSICAL HEALTH: ON AVERAGE, HOW MANY DAYS PER WEEK DO YOU ENGAGE IN MODERATE TO STRENUOUS EXERCISE (LIKE A BRISK WALK)?: 0 DAYS

## 2025-06-03 SDOH — HEALTH STABILITY: PHYSICAL HEALTH: ON AVERAGE, HOW MANY MINUTES DO YOU ENGAGE IN EXERCISE AT THIS LEVEL?: 0 MIN

## 2025-06-03 ASSESSMENT — SOCIAL DETERMINANTS OF HEALTH (SDOH): HOW OFTEN DO YOU GET TOGETHER WITH FRIENDS OR RELATIVES?: TWICE A WEEK

## 2025-06-03 NOTE — PATIENT INSTRUCTIONS
Patient Education   Preventive Care Advice   This is general advice given by our system to help you stay healthy. However, your care team may have specific advice just for you. Please talk to your care team about your preventive care needs.  Nutrition  Eat 5 or more servings of fruits and vegetables each day.  Try wheat bread, brown rice and whole grain pasta (instead of white bread, rice, and pasta).  Get enough calcium and vitamin D. Check the label on foods and aim for 100% of the RDA (recommended daily allowance).  Lifestyle  Exercise at least 150 minutes each week  (30 minutes a day, 5 days a week).  Do muscle strengthening activities 2 days a week. These help control your weight and prevent disease.  No smoking.  Wear sunscreen to prevent skin cancer.  Have a dental exam and cleaning every 6 months.  Yearly exams  See your health care team every year to talk about:  Any changes in your health.  Any medicines your care team has prescribed.  Preventive care, family planning, and ways to prevent chronic diseases.  Shots (vaccines)   HPV shots (up to age 26), if you've never had them before.  Hepatitis B shots (up to age 59), if you've never had them before.  COVID-19 shot: Get this shot when it's due.  Flu shot: Get a flu shot every year.  Tetanus shot: Get a tetanus shot every 10 years.  Pneumococcal, hepatitis A, and RSV shots: Ask your care team if you need these based on your risk.  Shingles shot (for age 50 and up)  General health tests  Diabetes screening:  Starting at age 35, Get screened for diabetes at least every 3 years.  If you are younger than age 35, ask your care team if you should be screened for diabetes.  Cholesterol test: At age 39, start having a cholesterol test every 5 years, or more often if advised.  Bone density scan (DEXA): At age 50, ask your care team if you should have this scan for osteoporosis (brittle bones).  Hepatitis C: Get tested at least once in your life.  STIs (sexually  transmitted infections)  Before age 24: Ask your care team if you should be screened for STIs.  After age 24: Get screened for STIs if you're at risk. You are at risk for STIs (including HIV) if:  You are sexually active with more than one person.  You don't use condoms every time.  You or a partner was diagnosed with a sexually transmitted infection.  If you are at risk for HIV, ask about PrEP medicine to prevent HIV.  Get tested for HIV at least once in your life, whether you are at risk for HIV or not.  Cancer screening tests  Cervical cancer screening: If you have a cervix, begin getting regular cervical cancer screening tests starting at age 21.  Breast cancer scan (mammogram): If you've ever had breasts, begin having regular mammograms starting at age 40. This is a scan to check for breast cancer.  Colon cancer screening: It is important to start screening for colon cancer at age 45.  Have a colonoscopy test every 10 years (or more often if you're at risk) Or, ask your provider about stool tests like a FIT test every year or Cologuard test every 3 years.  To learn more about your testing options, visit:   .  For help making a decision, visit:   https://bit.ly/wx24532.  Prostate cancer screening test: If you have a prostate, ask your care team if a prostate cancer screening test (PSA) at age 55 is right for you.  Lung cancer screening: If you are a current or former smoker ages 50 to 80, ask your care team if ongoing lung cancer screenings are right for you.  For informational purposes only. Not to replace the advice of your health care provider. Copyright   2023 [facility]. All rights reserved. Clinically reviewed by the [facility] Transitions Program. E96 [id number] - REV 01/24.  Learning About Activities of Daily Living  What are activities of daily living?     Activities of daily living (ADLs) are the basic self-care tasks you do every day. These include eating, bathing, dressing,  and moving around.  As you age, and if you have health problems, you may find that it's harder to do some of these tasks. If so, your doctor can suggest ideas that may help.  To measure what kind of help you may need, your doctor will ask how well you are able to do ADLs. Let your doctor know if there are any tasks that you are having trouble doing. This is an important first step to getting help. And when you have the help you need, you can stay as independent as possible.  How will a doctor assess your ADLs?  Asking about ADLs is part of a routine health checkup your doctor will likely do as you age. Your health check might be done in a doctor's office, in your home, or at a hospital. The goal is to find out if you are having any problems that could make it hard to care for yourself or that make it unsafe for you to be on your own.  To measure your ADLs, your doctor will ask how hard it is for you to do routine tasks. Your doctor may also want to know if you have changed the way you do a task because of a health problem. Your doctor may watch how you:  Walk back and forth.  Keep your balance while you stand or walk.  Move from sitting to standing or from a bed to a chair.  Button or unbutton a shirt or sweater.  Remove and put on your shoes.  It's common to feel a little worried or anxious if you find you can't do all the things you used to be able to do. Talking with your doctor about ADLs is a way to make sure you're as safe as possible and able to care for yourself as well as you can. You may want to bring a caregiver, friend, or family member to your checkup. They can help you talk to your doctor.  Follow-up care is a key part of your treatment and safety. Be sure to make and go to all appointments, and call your doctor if you are having problems. It's also a good idea to know your test results and keep a list of the medicines you take.  Current as of: October 24, 2024  Content Version: 14.4    7011-1359  [employer].   Care instructions adapted under license by your healthcare professional. If you have questions about a medical condition or this instruction, always ask your healthcare professional. [employer] disclaims any warranty or liability for your use of this information.    Preventing Falls: Care Instructions  Injuries and health problems such as trouble walking or poor eyesight can increase your risk of falling. So can some medicines. But there are things you can do to help prevent falls. You can exercise to get stronger. You can also arrange your home to make it safer.    Talk to your doctor about the medicines you take. Ask if any of them increase the risk of falls and whether they can be changed or stopped.   Try to exercise regularly. It can help improve your strength and balance. This can help lower your risk of falling.         Practice fall safety and prevention.   Wear low-heeled shoes that fit well and give your feet good support. Talk to your doctor if you have foot problems that make this hard.  Carry a cellphone or wear a medical alert device that you can use to call for help.  Use stepladders instead of chairs to reach high objects. Don't climb if you're at risk for falls. Ask for help, if needed.  Wear the correct eyeglasses, if you need them.        Make your home safer.   Remove rugs, cords, clutter, and furniture from walkways.  Keep your house well lit. Use night-lights in hallways and bathrooms.  Install and use sturdy handrails on stairways.  Wear nonskid footwear, even inside. Don't walk barefoot or in socks without shoes.        Be safe outside.   Use handrails, curb cuts, and ramps whenever possible.  Keep your hands free by using a shoulder bag or backpack.  Try to walk in well-lit areas. Watch out for uneven ground, changes in pavement, and debris.  Be careful in the winter. Walk on the grass or gravel when sidewalks are slippery. Use de-icer on steps and  "walkways. Add non-slip devices to shoes.    Put grab bars and nonskid mats in your shower or tub and near the toilet. Try to use a shower chair or bath bench when bathing.   Get into a tub or shower by putting in your weaker leg first. Get out with your strong side first. Have a phone or medical alert device in the bathroom with you.   Where can you learn more?  Go to https://www.Codewise.net/patiented  Enter G117 in the search box to learn more about \"Preventing Falls: Care Instructions.\"  Current as of: July 31, 2024  Content Version: 14.4    7634-8685 [employer].   Care instructions adapted under license by your healthcare professional. If you have questions about a medical condition or this instruction, always ask your healthcare professional. [employer] disclaims any warranty or liability for your use of this information.       "

## 2025-06-03 NOTE — LETTER
" 6/3/2025      James Esteban  [facility]  [address]  [facility] [address]        Preventive Care Visit  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 Wheelchair dependent. No improvement expected. Requires 24 hour nursing care. At risk for skin breakdown, pain, but no acute concerns at this time.   Plan: Continue current POC with no changes at this time and adjustments as needed.    (E11.9) Type 2 diabetes mellitus without complication, without long-term current use of insulin (H)  Comment: Well controlled with metformin  Plan: Continue current POC with no changes at this time and adjustments as needed.    (I10) Primary hypertension  Comment: Chronic, controlled  Plan: Continue current POC with no changes at this time and adjustments as needed.    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 No [facility] concerns    (N31.9) Neurogenic bladder  Comment: Chronic due to paraplegia, indwelling catheter  Plan: Continue current POC with no changes at this time and adjustments as needed.    (K59.2) Neurogenic bowel  Comment: Chronic due to paraplegia. Controlled with current medication regimen  Plan: Continue current POC with no changes at this time and adjustments as needed.    Patient has been advised of split billing requirements and indicates understanding: N/A        BMI  Estimated body mass index is 25.87 kg/m  as calculated from the following:    Height as of this encounter: 1.676 m (5' 6\").    Weight as of this encounter: 72.7 kg (160 lb 4.8 oz).       Counseling  Appropriate preventive services were addressed with this patient via screening, questionnaire, or discussion as appropriate for fall prevention, nutrition, physical activity, Tobacco-use cessation, social " engagement, weight loss and cognition.  Checklist reviewing preventive services available has been given to the patient.  Reviewed patient's diet, addressing concerns and/or questions.   Updated plan of care.  Patient reported difficulty with activities of daily living were addressed today (chronic due to paraplegia, lives in LTC)      Olivier Ramirez is a 74 year old, presenting for the following:  Wellness Visit        HPI  Patient moved into this LTC facility 1/22/25. He was living at the same facility as his wife, but [facility], so he had to move here. He goes to visit his wife once a week. He takes metro mobility. He also goes out to his Taoist, out to visit his son. [facility], next appointment 6/6/25. He does not have any acute concerns today      Advance Care Planning  Document on file is a Health Care Directive or POLST.      Today's PHQ-2 Score:       6/3/2025    12:42 PM   PHQ-2 ( 1999 [employer])   Q1: Little interest or pleasure in doing things 0   Q2: Feeling down, depressed or hopeless 0   PHQ-2 Score 0       ASCVD Risk   The 10-year ASCVD risk score (Simi LOZADA, et al., 2019) is: 38.4%    Values used to calculate the score:      Age: 74 years      Sex: Male      Is Non- : No      Diabetic: Yes      Tobacco smoker: No      Systolic Blood Pressure: 118 mmHg      Is BP treated: Yes      HDL Cholesterol: 64 mg/dL      Total Cholesterol: 172 mg/dL        Reviewed and updated as needed this visit by Provider   Tobacco   Meds   Med Hx  Surg Hx   Soc Hx Sexual Activity          Labs reviewed in EPIC  Current providers sharing in care for this patient include:  Patient Care Team:  Nataly Cuadra APRN CNP as PCP - General (Geriatric Medicine)  [facility] [place]  Blank Cabrera MD as MD (Internal Medicine)  Nataly Cuadra APRN CNP as Assigned PCP    The following health maintenance items are reviewed in  "Epic and correct as of today:  Health Maintenance   Topic Date Due     MICROALBUMIN  Never done     DIABETIC FOOT EXAM  Never done     ADVANCE CARE PLANNING  Never done     EYE EXAM  Never done     HEPATITIS C SCREENING  Never done     PNEUMOCOCCAL VACCINE 50+ YEARS (1 of 2 - PCV) Never done     COLORECTAL CANCER SCREENING  11/07/2023     BMP  01/25/2025     LIPID  01/25/2025     COVID-19 VACCINE (4 - 2024-25 season) 04/16/2025     A1C  06/19/2025     MEDICARE ANNUAL WELLNESS VISIT  06/03/2026     FALL RISK ASSESSMENT  06/03/2026     DTAP/TDAP/TD VACCINE (3 - Td or Tdap) 01/14/2035     PHQ-2 (once per calendar year)  Completed     INFLUENZA VACCINE  Completed     ZOSTER VACCINE  Completed     RSV VACCINE  Completed     HPV VACCINE  Aged Out     MENINGITIS VACCINE  Aged Out         Review of Systems  Constitutional, HEENT, cardiovascular, pulmonary, gi and gu systems are negative, except as otherwise noted.     Objective   Exam  /79   Pulse 73   Temp 97.3  F (36.3  C)   Resp 18   Ht 1.676 m (5' 6\")   Wt 72.7 kg (160 lb 4.8 oz)   SpO2 98%   BMI 25.87 kg/m     Estimated body mass index is 25.87 kg/m  as calculated from the following:    Height as of this encounter: 1.676 m (5' 6\").    Weight as of this encounter: 72.7 kg (160 lb 4.8 oz).    Physical Exam  GENERAL: alert and no distress  EYES: Eyes grossly normal to inspection, PERRL and conjunctivae and sclerae normal  HENT: normal cephalic/atraumatic and oral mucous membranes moist  NECK: very faint thyroidectomy scar  RESP: lungs clear to auscultation - no rales, rhonchi or wheezes  CV: regular rate and rhythm, normal S1 S2, no S3 or S4, no murmur, click or rub, no peripheral edema  ABDOMEN: soft, nontender and bowel sounds normal  SKIN: no suspicious lesions or rashes  NEURO: mentation intact and no purposeful movement of legs  PSYCH: mentation appears normal, affect normal/bright         6/3/2025   Mini Cog   Clock Draw Score 2 Normal   3 Item Recall 3 " objects recalled   Mini Cog Total Score 5          Signed Electronically by: TRINITY Guthrie CNP        Sincerely,        TRINITY Guthrie CNP    Electronically signed

## 2025-06-03 NOTE — PROGRESS NOTES
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 Wheelchair dependent. No improvement expected. Requires 24 hour nursing care. At risk for skin breakdown, pain, but no acute concerns at this time.   Plan: Continue current POC with no changes at this time and adjustments as needed.    (E11.9) Type 2 diabetes mellitus without complication, without long-term current use of insulin (H)  Comment: Well controlled with metformin  Plan: Continue current POC with no changes at this time and adjustments as needed.    (I10) Primary hypertension  Comment: Chronic, controlled  Plan: Continue current POC with no changes at this time and adjustments as needed.    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 No acute concerns    (N31.9) Neurogenic bladder  Comment: Chronic due to paraplegia, indwelling catheter  Plan: Continue current POC with no changes at this time and adjustments as needed.    (K59.2) Neurogenic bowel  Comment: Chronic due to paraplegia. Controlled with current medication regimen  Plan: Continue current POC with no changes at this time and adjustments as needed.    Patient has been advised of split billing requirements and indicates understanding: N/A        BMI  Estimated body mass index is 25.87 kg/m  as calculated from the following:    Height as of this encounter: 1.676 m (5' 6\").    Weight as of this encounter: 72.7 kg (160 lb 4.8 oz).       Counseling  Appropriate preventive services were addressed with this patient via screening, questionnaire, or discussion as appropriate for fall prevention, nutrition, physical activity, Tobacco-use cessation, social engagement, weight loss and cognition.  Checklist reviewing preventive services available has been given to " the patient.  Reviewed patient's diet, addressing concerns and/or questions.   Updated plan of care.  Patient reported difficulty with activities of daily living were addressed today (chronic due to paraplegia, lives in LTC)      Olivier Ramirez is a 74 year old, presenting for the following:  Wellness Visit        HPI  Patient moved into this LTC facility 1/22/25. He was living at the same facility as his wife, but they did not have a [facility], so he had to move here. He goes to visit his wife once a week. He takes [employer] mobility. He also goes out to his Confucianist, out to visit his son. [facility], next appointment 6/6/25. He does not have any acute concerns today      Advance Care Planning  Document on file is a Health Care Directive or POLST.      Today's PHQ-2 Score:       6/3/2025    12:42 PM   PHQ-2 ( 1999 [employer])   Q1: Little interest or pleasure in doing things 0   Q2: Feeling down, depressed or hopeless 0   PHQ-2 Score 0       ASCVD Risk   The 10-year ASCVD risk score (Simi LOZADA, et al., 2019) is: 38.4%    Values used to calculate the score:      Age: 74 years      Sex: Male      Is Non- : No      Diabetic: Yes      Tobacco smoker: No      Systolic Blood Pressure: 118 mmHg      Is BP treated: Yes      HDL Cholesterol: 64 mg/dL      Total Cholesterol: 172 mg/dL        Reviewed and updated as needed this visit by Provider   Tobacco   Meds   Med Hx  Surg Hx   Soc Hx Sexual Activity          Labs reviewed in EPIC  Current providers sharing in care for this patient include:  Patient Care Team:  Nataly Cuadra APRN CNP as PCP - General (Geriatric Medicine)  [facility], Blank Cunningham MD as MD (Internal Medicine)  Nataly Cuadra APRN CNP as Assigned PCP    The following health maintenance items are reviewed in Epic and correct as of today:  Health Maintenance   Topic Date Due    MICROALBUMIN  Never done    DIABETIC  "FOOT EXAM  Never done    ADVANCE CARE PLANNING  Never done    EYE EXAM  Never done    HEPATITIS C SCREENING  Never done    PNEUMOCOCCAL VACCINE 50+ YEARS (1 of 2 - PCV) Never done    COLORECTAL CANCER SCREENING  11/07/2023    BMP  01/25/2025    LIPID  01/25/2025    COVID-19 VACCINE (4 - 2024-25 season) 04/16/2025    A1C  06/19/2025    MEDICARE ANNUAL WELLNESS VISIT  06/03/2026    FALL RISK ASSESSMENT  06/03/2026    DTAP/TDAP/TD VACCINE (3 - Td or Tdap) 01/14/2035    PHQ-2 (once per calendar year)  Completed    INFLUENZA VACCINE  Completed    ZOSTER VACCINE  Completed    RSV VACCINE  Completed    HPV VACCINE  Aged Out    MENINGITIS VACCINE  Aged Out         Review of Systems  Constitutional, HEENT, cardiovascular, pulmonary, gi and gu systems are negative, except as otherwise noted.     Objective    Exam  /79   Pulse 73   Temp 97.3  F (36.3  C)   Resp 18   Ht 1.676 m (5' 6\")   Wt 72.7 kg (160 lb 4.8 oz)   SpO2 98%   BMI 25.87 kg/m     Estimated body mass index is 25.87 kg/m  as calculated from the following:    Height as of this encounter: 1.676 m (5' 6\").    Weight as of this encounter: 72.7 kg (160 lb 4.8 oz).    Physical Exam  GENERAL: alert and no distress  EYES: Eyes grossly normal to inspection, PERRL and conjunctivae and sclerae normal  HENT: normal cephalic/atraumatic and oral mucous membranes moist  NECK: very faint thyroidectomy scar  RESP: lungs clear to auscultation - no rales, rhonchi or wheezes  CV: regular rate and rhythm, normal S1 S2, no S3 or S4, no murmur, click or rub, no peripheral edema  ABDOMEN: soft, nontender and bowel sounds normal  SKIN: no suspicious lesions or rashes  NEURO: mentation intact and no purposeful movement of legs  PSYCH: mentation appears normal, affect normal/bright         6/3/2025   Mini Cog   Clock Draw Score 2 Normal   3 Item Recall 3 objects recalled   Mini Cog Total Score 5          Signed Electronically by: Nataly Cuadra, APRN CNP    "

## 2025-06-29 ENCOUNTER — HEALTH MAINTENANCE LETTER (OUTPATIENT)
Age: 75
End: 2025-06-29

## 2025-07-08 ENCOUNTER — NURSING HOME VISIT (OUTPATIENT)
Dept: GERIATRICS | Facility: CLINIC | Age: 75
End: 2025-07-08
Payer: MEDICARE

## 2025-07-08 VITALS
OXYGEN SATURATION: 96 % | TEMPERATURE: 98.4 F | RESPIRATION RATE: 18 BRPM | BODY MASS INDEX: 26.66 KG/M2 | WEIGHT: 165.9 LBS | DIASTOLIC BLOOD PRESSURE: 95 MMHG | SYSTOLIC BLOOD PRESSURE: 163 MMHG | HEART RATE: 83 BPM | HEIGHT: 66 IN

## 2025-07-08 DIAGNOSIS — T83.89XS EROSION OF URETHRA DUE TO CATHETERIZATION OF URINARY TRACT, SEQUELA: ICD-10-CM

## 2025-07-08 DIAGNOSIS — K21.9 GASTROESOPHAGEAL REFLUX DISEASE WITHOUT ESOPHAGITIS: ICD-10-CM

## 2025-07-08 DIAGNOSIS — N31.9 NEUROGENIC BLADDER: Primary | ICD-10-CM

## 2025-07-08 DIAGNOSIS — N36.8 EROSION OF URETHRA DUE TO CATHETERIZATION OF URINARY TRACT, SEQUELA: ICD-10-CM

## 2025-07-08 PROCEDURE — 99309 SBSQ NF CARE MODERATE MDM 30: CPT | Performed by: NURSE PRACTITIONER

## 2025-07-08 RX ORDER — FAMOTIDINE 20 MG/1
20 TABLET, FILM COATED ORAL 2 TIMES DAILY PRN
Status: SHIPPED
Start: 2025-07-08

## 2025-07-08 RX ORDER — CEPHALEXIN 500 MG/1
500 CAPSULE ORAL 3 TIMES DAILY
COMMUNITY
End: 2025-07-09

## 2025-07-08 NOTE — LETTER
" 7/8/2025      James Esteban  [facility]  [address]  [facility] [address]        [facility] GERIATRICS    Chief Complaint   Patient presents with     Hospital F/U     HPI:  James Esteban is a 74 year old  (1950), who is being seen today for an episodic care visit at: [facility] () [[id number]].     Today's concern is:   Patient was sent to [facility]'s ED 7/3/25 due to near syncope. Staff report that there was an outside party that day for July 4th and it was very hot out. At the ED, he was given a script for Keflex for 5 days for probable UTI. He has a chronic morales. Staff report that they have just discovered a urethral tear related to his morales. He is uncircumcised, so his foreskin typically covers this. When they pulled it back yesterday, there is a lengthy opening starting at the meatus. He reports that he was complaining of pain. Now that they moved the catheter to his right leg, he is not having pain.   Also of note, pharmacy recommendation received noting he has been on PPI since admission. He denies any issues with heartburn    Allergies, and PMH/PSH reviewed in [facility] today.  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 He has not been to see urology that provider is aware of since he admitted here. [facility] and [facility]. Recommend he sees urology ASAP. Staff will need to call the [facility] to see if they can take him or if they authorize that he can be seen elsewhere    (K21.9) Gastroesophageal reflux disease without esophagitis  Comment: Asymptomatic. No hx of GI bleed. Will trial patient off PPI      Orders:  Schedule urology appt  Discontinue omeprazole  Famotidine 20mg BID prn    Electronically signed by: TRINITY Guthrie CNP                 Sincerely,        TRINITY Guthrie CNP    Electronically signed

## 2025-07-08 NOTE — PROGRESS NOTES
"[facility] GERIATRICS    Chief Complaint   Patient presents with    Hospital F/U     HPI:  James Esteban is a 74 year old  (1950), who is being seen today for an episodic care visit at: [facility] () [[id number]].     Today's concern is:   Patient was sent to [facility] ED 7/3/25 due to near syncope. Staff report that there was an outside party that day for July 4th and it was very hot out. At the ED, he was given a script for Keflex for 5 days for probable UTI. He has a chronic morales. Staff report that they have just discovered a urethral tear related to his morales. He is uncircumcised, so his foreskin typically covers this. When they pulled it back yesterday, there is a lengthy opening starting at the meatus. He reports that he was complaining of pain. Now that they moved the catheter to his right leg, he is not having pain.   Also of note, pharmacy recommendation received noting he has been on PPI since admission. He denies any issues with heartburn    Allergies, and PMH/PSH reviewed in [facility] today.  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 He has not been to see urology that provider is aware of since he admitted here. [facility] and not [facility] are available. Recommend he sees urology ASAP. Staff will need to call the [facility] to " see if they can take him or if they authorize that he can be seen elsewhere    (K21.9) Gastroesophageal reflux disease without esophagitis  Comment: Asymptomatic. No hx of GI bleed. Will trial patient off PPI      Orders:  Schedule urology appt  Discontinue omeprazole  Famotidine 20mg BID prn    Electronically signed by: TRINITY Guthrie CNP

## 2025-08-01 ENCOUNTER — NURSING HOME VISIT (OUTPATIENT)
Dept: GERIATRICS | Facility: CLINIC | Age: 75
End: 2025-08-01
Payer: MEDICARE

## 2025-08-01 VITALS
TEMPERATURE: 97.8 F | BODY MASS INDEX: 26.86 KG/M2 | OXYGEN SATURATION: 98 % | DIASTOLIC BLOOD PRESSURE: 94 MMHG | HEIGHT: 66 IN | WEIGHT: 167.1 LBS | SYSTOLIC BLOOD PRESSURE: 174 MMHG | RESPIRATION RATE: 18 BRPM | HEART RATE: 64 BPM

## 2025-08-01 DIAGNOSIS — I95.1 ORTHOSTATIC HYPOTENSION: ICD-10-CM

## 2025-08-01 DIAGNOSIS — I10 PRIMARY HYPERTENSION: Primary | ICD-10-CM

## 2025-08-01 PROCEDURE — 99309 SBSQ NF CARE MODERATE MDM 30: CPT | Performed by: NURSE PRACTITIONER

## 2025-08-06 ENCOUNTER — NURSING HOME VISIT (OUTPATIENT)
Dept: GERIATRICS | Facility: CLINIC | Age: 75
End: 2025-08-06
Payer: MEDICARE

## 2025-08-06 VITALS
OXYGEN SATURATION: 97 % | HEIGHT: 66 IN | DIASTOLIC BLOOD PRESSURE: 82 MMHG | HEART RATE: 73 BPM | WEIGHT: 171.3 LBS | RESPIRATION RATE: 18 BRPM | TEMPERATURE: 97.8 F | BODY MASS INDEX: 27.53 KG/M2 | SYSTOLIC BLOOD PRESSURE: 140 MMHG

## 2025-08-28 ENCOUNTER — NURSING HOME VISIT (OUTPATIENT)
Dept: GERIATRICS | Facility: CLINIC | Age: 75
End: 2025-08-28
Payer: MEDICARE

## 2025-08-28 VITALS
WEIGHT: 170.5 LBS | HEART RATE: 75 BPM | DIASTOLIC BLOOD PRESSURE: 78 MMHG | OXYGEN SATURATION: 93 % | TEMPERATURE: 97.8 F | SYSTOLIC BLOOD PRESSURE: 113 MMHG | BODY MASS INDEX: 27.52 KG/M2 | RESPIRATION RATE: 18 BRPM

## 2025-08-28 DIAGNOSIS — I95.9 HYPOTENSION, UNSPECIFIED HYPOTENSION TYPE: ICD-10-CM

## 2025-08-28 DIAGNOSIS — R55 SYNCOPE, UNSPECIFIED SYNCOPE TYPE: Primary | ICD-10-CM

## 2025-08-28 DIAGNOSIS — G90.4 AUTONOMIC DYSREFLEXIA: ICD-10-CM

## 2025-08-28 PROBLEM — N31.9 NEUROGENIC BLADDER: Status: ACTIVE | Noted: 2023-09-12

## 2025-08-28 PROBLEM — R25.2 SPASM: Status: ACTIVE | Noted: 2023-10-01
